# Patient Record
Sex: FEMALE | Race: WHITE | NOT HISPANIC OR LATINO | Employment: UNEMPLOYED | URBAN - NONMETROPOLITAN AREA
[De-identification: names, ages, dates, MRNs, and addresses within clinical notes are randomized per-mention and may not be internally consistent; named-entity substitution may affect disease eponyms.]

---

## 2018-09-24 ENCOUNTER — HOSPITAL ENCOUNTER (EMERGENCY)
Facility: HOSPITAL | Age: 33
Discharge: HOME | End: 2018-09-24
Attending: STUDENT IN AN ORGANIZED HEALTH CARE EDUCATION/TRAINING PROGRAM | Admitting: STUDENT IN AN ORGANIZED HEALTH CARE EDUCATION/TRAINING PROGRAM

## 2018-09-24 VITALS
HEART RATE: 110 BPM | HEIGHT: 65 IN | OXYGEN SATURATION: 97 % | BODY MASS INDEX: 19.43 KG/M2 | WEIGHT: 116.6 LBS | SYSTOLIC BLOOD PRESSURE: 111 MMHG | DIASTOLIC BLOOD PRESSURE: 80 MMHG | TEMPERATURE: 98.2 F | RESPIRATION RATE: 18 BRPM

## 2018-09-24 DIAGNOSIS — N39.0 URINARY TRACT INFECTION WITHOUT HEMATURIA, SITE UNSPECIFIED: Primary | ICD-10-CM

## 2018-09-24 LAB
AMORPH URATE CRY URNS QL MICRO: ABNORMAL /HPF
B-HCG UR QL: NEGATIVE
BACTERIA UR QL AUTO: ABNORMAL /HPF
BILIRUB UR QL STRIP: NEGATIVE
CLARITY UR: ABNORMAL
COLOR UR: YELLOW
GLUCOSE UR STRIP-MCNC: NEGATIVE MG/DL
HGB UR QL STRIP.AUTO: ABNORMAL
HYALINE CASTS UR QL AUTO: ABNORMAL /LPF
KETONES UR QL STRIP: NEGATIVE
LEUKOCYTE ESTERASE UR QL STRIP.AUTO: ABNORMAL
NITRITE UR QL STRIP: NEGATIVE
PH UR STRIP.AUTO: 7 [PH] (ref 5–8)
PROT UR QL STRIP: NEGATIVE
RBC # UR: ABNORMAL /HPF
REF LAB TEST METHOD: ABNORMAL
SP GR UR STRIP: 1.02 (ref 1–1.03)
SQUAMOUS #/AREA URNS HPF: ABNORMAL /HPF
UROBILINOGEN UR QL STRIP: ABNORMAL
WBC UR QL AUTO: ABNORMAL /HPF

## 2018-09-24 PROCEDURE — 81025 URINE PREGNANCY TEST: CPT | Performed by: STUDENT IN AN ORGANIZED HEALTH CARE EDUCATION/TRAINING PROGRAM

## 2018-09-24 PROCEDURE — 81001 URINALYSIS AUTO W/SCOPE: CPT | Performed by: STUDENT IN AN ORGANIZED HEALTH CARE EDUCATION/TRAINING PROGRAM

## 2018-09-24 PROCEDURE — 87086 URINE CULTURE/COLONY COUNT: CPT | Performed by: STUDENT IN AN ORGANIZED HEALTH CARE EDUCATION/TRAINING PROGRAM

## 2018-09-24 PROCEDURE — 99283 EMERGENCY DEPT VISIT LOW MDM: CPT

## 2018-09-24 RX ORDER — CEFUROXIME AXETIL 500 MG/1
500 TABLET ORAL 2 TIMES DAILY
Qty: 14 TABLET | Refills: 0 | Status: SHIPPED | OUTPATIENT
Start: 2018-09-24 | End: 2019-06-19

## 2018-09-24 RX ORDER — PHENAZOPYRIDINE HYDROCHLORIDE 200 MG/1
200 TABLET, FILM COATED ORAL 3 TIMES DAILY PRN
Qty: 9 TABLET | Refills: 0 | OUTPATIENT
Start: 2018-09-24 | End: 2020-08-14

## 2018-09-24 NOTE — ED PROVIDER NOTES
Subjective   Patient is a 33-year-old female that presents with 2 days progressively worsening UTI symptoms.  Patient states it burns when she urinates.  She feels like she can ever fully empty.  Patient reports she had to be admitted for almost right his last November and does not want this to happen again.  She denies fever, chills, sweats, nausea or vomiting.            Review of Systems   All other systems reviewed and are negative.      Past Medical History:   Diagnosis Date   • Hepatitis C        Allergies   Allergen Reactions   • Phenergan [Promethazine Hcl]        History reviewed. No pertinent surgical history.    History reviewed. No pertinent family history.    Social History     Social History   • Marital status:      Social History Main Topics   • Smoking status: Current Every Day Smoker     Packs/day: 0.50     Types: Cigarettes   • Smokeless tobacco: Never Used   • Alcohol use Yes      Comment: on occasion   • Drug use: Yes     Types: Methamphetamines      Comment: former user   • Sexual activity: Defer     Other Topics Concern   • Not on file           Objective   Physical Exam   Nursing note and vitals reviewed.    GEN: No acute distress  Head: Normocephalic, atraumatic  Eyes: Pupils equal round reactive to light  ENT: Posterior pharynx normal in appearance, oral mucosa is moist  Chest: Nontender to palpation  Cardiovascular: Regular rate  Lungs: Clear to auscultation bilaterally  Abdomen: Soft, nontender, nondistended, no peritoneal signs  Extremities: No edema, normal appearance  Neuro: GCS 15  Psych: Mood and affect are appropriate    Procedures           ED Course                  MDM  Number of Diagnoses or Management Options  Urinary tract infection without hematuria, site unspecified:   Diagnosis management comments: Differential diagnosis would include UTI, pyelonephritis, interstitial cystitis, STD, or other concerns  Patient declines STD testing at this time.  Recommended follow-up  in one week with primary care provider if symptoms not improving.  Advised return to the emergency department if new or worsening symptoms.  Use medications as directed.       Amount and/or Complexity of Data Reviewed  Clinical lab tests: reviewed          Final diagnoses:   Urinary tract infection without hematuria, site unspecified            Alex Landaverde MD  09/24/18 0620

## 2018-09-25 LAB — BACTERIA SPEC AEROBE CULT: NORMAL

## 2019-06-19 ENCOUNTER — HOSPITAL ENCOUNTER (EMERGENCY)
Facility: HOSPITAL | Age: 34
Discharge: HOME | End: 2019-06-19
Attending: EMERGENCY MEDICINE | Admitting: EMERGENCY MEDICINE

## 2019-06-19 ENCOUNTER — APPOINTMENT (OUTPATIENT)
Dept: CT IMAGING | Facility: HOSPITAL | Age: 34
End: 2019-06-19

## 2019-06-19 VITALS
WEIGHT: 116 LBS | BODY MASS INDEX: 19.33 KG/M2 | DIASTOLIC BLOOD PRESSURE: 63 MMHG | TEMPERATURE: 99.2 F | HEIGHT: 65 IN | RESPIRATION RATE: 18 BRPM | HEART RATE: 110 BPM | OXYGEN SATURATION: 95 % | SYSTOLIC BLOOD PRESSURE: 103 MMHG

## 2019-06-19 DIAGNOSIS — N12 PYELONEPHRITIS: Primary | ICD-10-CM

## 2019-06-19 LAB
ALBUMIN SERPL-MCNC: 4.2 G/DL (ref 3.5–5)
ALBUMIN/GLOB SERPL: 1.2 G/DL (ref 1–2)
ALP SERPL-CCNC: 101 U/L (ref 38–126)
ALT SERPL W P-5'-P-CCNC: 46 U/L (ref 13–69)
ANION GAP SERPL CALCULATED.3IONS-SCNC: 14.7 MMOL/L (ref 10–20)
AST SERPL-CCNC: 56 U/L (ref 15–46)
B-HCG UR QL: NEGATIVE
BACTERIA UR QL AUTO: ABNORMAL /HPF
BASOPHILS # BLD AUTO: 0.04 10*3/MM3 (ref 0–0.2)
BASOPHILS NFR BLD AUTO: 0.2 % (ref 0–1.5)
BILIRUB SERPL-MCNC: 0.8 MG/DL (ref 0.2–1.3)
BILIRUB UR QL STRIP: NEGATIVE
BUN BLD-MCNC: 12 MG/DL (ref 7–20)
BUN/CREAT SERPL: 17.1 (ref 7.1–23.5)
CALCIUM SPEC-SCNC: 8.8 MG/DL (ref 8.4–10.2)
CHLORIDE SERPL-SCNC: 96 MMOL/L (ref 98–107)
CLARITY UR: ABNORMAL
CO2 SERPL-SCNC: 27 MMOL/L (ref 26–30)
COLOR UR: YELLOW
CREAT BLD-MCNC: 0.7 MG/DL (ref 0.6–1.3)
DEPRECATED RDW RBC AUTO: 45.9 FL (ref 37–54)
EOSINOPHIL # BLD AUTO: 0.01 10*3/MM3 (ref 0–0.4)
EOSINOPHIL NFR BLD AUTO: 0.1 % (ref 0.3–6.2)
ERYTHROCYTE [DISTWIDTH] IN BLOOD BY AUTOMATED COUNT: 13.5 % (ref 12.3–15.4)
GFR SERPL CREATININE-BSD FRML MDRD: 96 ML/MIN/1.73
GLOBULIN UR ELPH-MCNC: 3.6 GM/DL
GLUCOSE BLD-MCNC: 91 MG/DL (ref 74–98)
GLUCOSE UR STRIP-MCNC: NEGATIVE MG/DL
HCT VFR BLD AUTO: 35.5 % (ref 34–46.6)
HGB BLD-MCNC: 11.7 G/DL (ref 12–15.9)
HGB UR QL STRIP.AUTO: ABNORMAL
HYALINE CASTS UR QL AUTO: ABNORMAL /LPF
IMM GRANULOCYTES # BLD AUTO: 0.06 10*3/MM3 (ref 0–0.05)
IMM GRANULOCYTES NFR BLD AUTO: 0.3 % (ref 0–0.5)
KETONES UR QL STRIP: NEGATIVE
LEUKOCYTE ESTERASE UR QL STRIP.AUTO: ABNORMAL
LYMPHOCYTES # BLD AUTO: 2 10*3/MM3 (ref 0.7–3.1)
LYMPHOCYTES NFR BLD AUTO: 11 % (ref 19.6–45.3)
MCH RBC QN AUTO: 30.1 PG (ref 26.6–33)
MCHC RBC AUTO-ENTMCNC: 33 G/DL (ref 31.5–35.7)
MCV RBC AUTO: 91.3 FL (ref 79–97)
MONOCYTES # BLD AUTO: 1.5 10*3/MM3 (ref 0.1–0.9)
MONOCYTES NFR BLD AUTO: 8.3 % (ref 5–12)
MUCOUS THREADS URNS QL MICRO: ABNORMAL /HPF
NEUTROPHILS # BLD AUTO: 14.49 10*3/MM3 (ref 1.7–7)
NEUTROPHILS NFR BLD AUTO: 80.1 % (ref 42.7–76)
NITRITE UR QL STRIP: NEGATIVE
NRBC BLD AUTO-RTO: 0 /100 WBC (ref 0–0.2)
PH UR STRIP.AUTO: 5.5 [PH] (ref 5–8)
PLATELET # BLD AUTO: 366 10*3/MM3 (ref 140–450)
PMV BLD AUTO: 9.5 FL (ref 6–12)
POTASSIUM BLD-SCNC: 3.7 MMOL/L (ref 3.5–5.1)
PROT SERPL-MCNC: 7.8 G/DL (ref 6.3–8.2)
PROT UR QL STRIP: ABNORMAL
RBC # BLD AUTO: 3.89 10*6/MM3 (ref 3.77–5.28)
RBC # UR: ABNORMAL /HPF
REF LAB TEST METHOD: ABNORMAL
SODIUM BLD-SCNC: 134 MMOL/L (ref 137–145)
SP GR UR STRIP: 1.02 (ref 1–1.03)
SQUAMOUS #/AREA URNS HPF: ABNORMAL /HPF
UROBILINOGEN UR QL STRIP: ABNORMAL
WBC NRBC COR # BLD: 18.1 10*3/MM3 (ref 3.4–10.8)
WBC UR QL AUTO: ABNORMAL /HPF

## 2019-06-19 PROCEDURE — 96365 THER/PROPH/DIAG IV INF INIT: CPT

## 2019-06-19 PROCEDURE — 87086 URINE CULTURE/COLONY COUNT: CPT | Performed by: EMERGENCY MEDICINE

## 2019-06-19 PROCEDURE — 74176 CT ABD & PELVIS W/O CONTRAST: CPT

## 2019-06-19 PROCEDURE — 81001 URINALYSIS AUTO W/SCOPE: CPT | Performed by: EMERGENCY MEDICINE

## 2019-06-19 PROCEDURE — 96375 TX/PRO/DX INJ NEW DRUG ADDON: CPT

## 2019-06-19 PROCEDURE — 85025 COMPLETE CBC W/AUTO DIFF WBC: CPT | Performed by: EMERGENCY MEDICINE

## 2019-06-19 PROCEDURE — 25010000002 CEFTRIAXONE SODIUM-DEXTROSE 1-3.74 GM-%(50ML) RECONSTITUTED SOLUTION: Performed by: EMERGENCY MEDICINE

## 2019-06-19 PROCEDURE — 81025 URINE PREGNANCY TEST: CPT | Performed by: EMERGENCY MEDICINE

## 2019-06-19 PROCEDURE — 25010000002 KETOROLAC TROMETHAMINE PER 15 MG: Performed by: EMERGENCY MEDICINE

## 2019-06-19 PROCEDURE — 80053 COMPREHEN METABOLIC PANEL: CPT | Performed by: EMERGENCY MEDICINE

## 2019-06-19 PROCEDURE — 99283 EMERGENCY DEPT VISIT LOW MDM: CPT

## 2019-06-19 RX ORDER — KETOROLAC TROMETHAMINE 30 MG/ML
15 INJECTION, SOLUTION INTRAMUSCULAR; INTRAVENOUS ONCE
Status: COMPLETED | OUTPATIENT
Start: 2019-06-19 | End: 2019-06-19

## 2019-06-19 RX ORDER — CEFTRIAXONE 1 G/50ML
1 INJECTION, SOLUTION INTRAVENOUS ONCE
Status: COMPLETED | OUTPATIENT
Start: 2019-06-19 | End: 2019-06-19

## 2019-06-19 RX ORDER — SODIUM CHLORIDE 0.9 % (FLUSH) 0.9 %
10 SYRINGE (ML) INJECTION AS NEEDED
Status: DISCONTINUED | OUTPATIENT
Start: 2019-06-19 | End: 2019-06-19 | Stop reason: HOSPADM

## 2019-06-19 RX ORDER — CEPHALEXIN 500 MG/1
500 CAPSULE ORAL 2 TIMES DAILY
Qty: 14 CAPSULE | Refills: 0 | Status: SHIPPED | OUTPATIENT
Start: 2019-06-19 | End: 2019-06-26

## 2019-06-19 RX ADMIN — CEFTRIAXONE 1 G: 1 INJECTION, SOLUTION INTRAVENOUS at 02:49

## 2019-06-19 RX ADMIN — SODIUM CHLORIDE 500 ML: 9 INJECTION, SOLUTION INTRAVENOUS at 01:39

## 2019-06-19 RX ADMIN — KETOROLAC TROMETHAMINE 15 MG: 30 INJECTION, SOLUTION INTRAMUSCULAR at 01:41

## 2019-06-20 LAB — BACTERIA SPEC AEROBE CULT: NORMAL

## 2020-08-14 PROBLEM — Z20.822 CLOSE EXPOSURE TO COVID-19 VIRUS: Status: ACTIVE | Noted: 2020-08-14

## 2020-08-14 PROBLEM — R06.02 SHORTNESS OF BREATH: Status: ACTIVE | Noted: 2020-08-14

## 2020-08-14 PROCEDURE — U0002 COVID-19 LAB TEST NON-CDC: HCPCS | Performed by: FAMILY MEDICINE

## 2020-08-14 PROCEDURE — U0004 COV-19 TEST NON-CDC HGH THRU: HCPCS | Performed by: FAMILY MEDICINE

## 2022-12-25 ENCOUNTER — APPOINTMENT (OUTPATIENT)
Dept: CT IMAGING | Facility: HOSPITAL | Age: 37
End: 2022-12-25

## 2022-12-25 ENCOUNTER — HOSPITAL ENCOUNTER (EMERGENCY)
Facility: HOSPITAL | Age: 37
Discharge: HOME | End: 2022-12-26
Attending: EMERGENCY MEDICINE | Admitting: EMERGENCY MEDICINE

## 2022-12-25 VITALS
BODY MASS INDEX: 20.83 KG/M2 | OXYGEN SATURATION: 100 % | HEIGHT: 65 IN | SYSTOLIC BLOOD PRESSURE: 137 MMHG | WEIGHT: 125 LBS | RESPIRATION RATE: 18 BRPM | TEMPERATURE: 98.4 F | DIASTOLIC BLOOD PRESSURE: 95 MMHG | HEART RATE: 111 BPM

## 2022-12-25 DIAGNOSIS — N12 PYELONEPHRITIS: ICD-10-CM

## 2022-12-25 DIAGNOSIS — B27.90 INFECTIOUS MONONUCLEOSIS WITHOUT COMPLICATION, INFECTIOUS MONONUCLEOSIS DUE TO UNSPECIFIED ORGANISM: Primary | ICD-10-CM

## 2022-12-25 LAB
ALBUMIN SERPL-MCNC: 3.8 G/DL (ref 3.5–5.2)
ALBUMIN/GLOB SERPL: 1 G/DL
ALP SERPL-CCNC: 115 U/L (ref 39–117)
ALT SERPL W P-5'-P-CCNC: 19 U/L (ref 1–33)
AMPHET+METHAMPHET UR QL: POSITIVE
AMPHETAMINES UR QL: POSITIVE
ANION GAP SERPL CALCULATED.3IONS-SCNC: 10.4 MMOL/L (ref 5–15)
AST SERPL-CCNC: 23 U/L (ref 1–32)
B-HCG UR QL: NEGATIVE
BACTERIA UR QL AUTO: ABNORMAL /HPF
BARBITURATES UR QL SCN: NEGATIVE
BASOPHILS # BLD AUTO: 0.02 10*3/MM3 (ref 0–0.2)
BASOPHILS NFR BLD AUTO: 0.2 % (ref 0–1.5)
BENZODIAZ UR QL SCN: NEGATIVE
BILIRUB SERPL-MCNC: 0.3 MG/DL (ref 0–1.2)
BILIRUB UR QL STRIP: NEGATIVE
BUN SERPL-MCNC: 15 MG/DL (ref 6–20)
BUN/CREAT SERPL: 17.9 (ref 7–25)
BUPRENORPHINE SERPL-MCNC: NEGATIVE NG/ML
CALCIUM SPEC-SCNC: 9.2 MG/DL (ref 8.6–10.5)
CANNABINOIDS SERPL QL: NEGATIVE
CHLORIDE SERPL-SCNC: 101 MMOL/L (ref 98–107)
CLARITY UR: ABNORMAL
CO2 SERPL-SCNC: 23.6 MMOL/L (ref 22–29)
COCAINE UR QL: NEGATIVE
COLOR UR: YELLOW
CREAT SERPL-MCNC: 0.84 MG/DL (ref 0.57–1)
D DIMER PPP FEU-MCNC: 0.71 MCGFEU/ML (ref 0–0.5)
DEPRECATED RDW RBC AUTO: 42.5 FL (ref 37–54)
EGFRCR SERPLBLD CKD-EPI 2021: 91.3 ML/MIN/1.73
EOSINOPHIL # BLD AUTO: 0.01 10*3/MM3 (ref 0–0.4)
EOSINOPHIL NFR BLD AUTO: 0.1 % (ref 0.3–6.2)
ERYTHROCYTE [DISTWIDTH] IN BLOOD BY AUTOMATED COUNT: 12.4 % (ref 12.3–15.4)
GLOBULIN UR ELPH-MCNC: 3.7 GM/DL
GLUCOSE SERPL-MCNC: 110 MG/DL (ref 65–99)
GLUCOSE UR STRIP-MCNC: NEGATIVE MG/DL
HCT VFR BLD AUTO: 31.3 % (ref 34–46.6)
HETEROPH AB SER QL LA: POSITIVE
HGB BLD-MCNC: 10.5 G/DL (ref 12–15.9)
HGB UR QL STRIP.AUTO: NEGATIVE
HOLD SPECIMEN: NORMAL
HOLD SPECIMEN: NORMAL
HYALINE CASTS UR QL AUTO: ABNORMAL /LPF
IMM GRANULOCYTES # BLD AUTO: 0.05 10*3/MM3 (ref 0–0.05)
IMM GRANULOCYTES NFR BLD AUTO: 0.4 % (ref 0–0.5)
KETONES UR QL STRIP: NEGATIVE
LEUKOCYTE ESTERASE UR QL STRIP.AUTO: ABNORMAL
LYMPHOCYTES # BLD AUTO: 1.44 10*3/MM3 (ref 0.7–3.1)
LYMPHOCYTES NFR BLD AUTO: 11.1 % (ref 19.6–45.3)
MCH RBC QN AUTO: 31.4 PG (ref 26.6–33)
MCHC RBC AUTO-ENTMCNC: 33.5 G/DL (ref 31.5–35.7)
MCV RBC AUTO: 93.7 FL (ref 79–97)
METHADONE UR QL SCN: NEGATIVE
MONOCYTES # BLD AUTO: 1.11 10*3/MM3 (ref 0.1–0.9)
MONOCYTES NFR BLD AUTO: 8.5 % (ref 5–12)
NEUTROPHILS NFR BLD AUTO: 10.36 10*3/MM3 (ref 1.7–7)
NEUTROPHILS NFR BLD AUTO: 79.7 % (ref 42.7–76)
NITRITE UR QL STRIP: POSITIVE
NRBC BLD AUTO-RTO: 0 /100 WBC (ref 0–0.2)
OPIATES UR QL: NEGATIVE
OXYCODONE UR QL SCN: NEGATIVE
PCP UR QL SCN: NEGATIVE
PH UR STRIP.AUTO: 6 [PH] (ref 5–8)
PLATELET # BLD AUTO: 463 10*3/MM3 (ref 140–450)
PMV BLD AUTO: 8.7 FL (ref 6–12)
POTASSIUM SERPL-SCNC: 3.7 MMOL/L (ref 3.5–5.2)
PROCALCITONIN SERPL-MCNC: 0.12 NG/ML (ref 0–0.25)
PROPOXYPH UR QL: NEGATIVE
PROT SERPL-MCNC: 7.5 G/DL (ref 6–8.5)
PROT UR QL STRIP: ABNORMAL
RBC # BLD AUTO: 3.34 10*6/MM3 (ref 3.77–5.28)
RBC # UR STRIP: ABNORMAL /HPF
REF LAB TEST METHOD: ABNORMAL
SODIUM SERPL-SCNC: 135 MMOL/L (ref 136–145)
SP GR UR STRIP: 1.02 (ref 1–1.03)
SQUAMOUS #/AREA URNS HPF: ABNORMAL /HPF
TRICYCLICS UR QL SCN: NEGATIVE
TROPONIN T SERPL-MCNC: <0.01 NG/ML (ref 0–0.03)
UROBILINOGEN UR QL STRIP: ABNORMAL
WBC # UR STRIP: ABNORMAL /HPF
WBC NRBC COR # BLD: 12.99 10*3/MM3 (ref 3.4–10.8)
WHOLE BLOOD HOLD COAG: NORMAL
WHOLE BLOOD HOLD SPECIMEN: NORMAL

## 2022-12-25 PROCEDURE — 81025 URINE PREGNANCY TEST: CPT | Performed by: PHYSICIAN ASSISTANT

## 2022-12-25 PROCEDURE — 84484 ASSAY OF TROPONIN QUANT: CPT | Performed by: PHYSICIAN ASSISTANT

## 2022-12-25 PROCEDURE — 85025 COMPLETE CBC W/AUTO DIFF WBC: CPT | Performed by: PHYSICIAN ASSISTANT

## 2022-12-25 PROCEDURE — 87086 URINE CULTURE/COLONY COUNT: CPT | Performed by: EMERGENCY MEDICINE

## 2022-12-25 PROCEDURE — 85379 FIBRIN DEGRADATION QUANT: CPT | Performed by: PHYSICIAN ASSISTANT

## 2022-12-25 PROCEDURE — 87186 SC STD MICRODIL/AGAR DIL: CPT | Performed by: EMERGENCY MEDICINE

## 2022-12-25 PROCEDURE — 25010000002 IOPAMIDOL 61 % SOLUTION: Performed by: EMERGENCY MEDICINE

## 2022-12-25 PROCEDURE — 80053 COMPREHEN METABOLIC PANEL: CPT | Performed by: PHYSICIAN ASSISTANT

## 2022-12-25 PROCEDURE — 71275 CT ANGIOGRAPHY CHEST: CPT

## 2022-12-25 PROCEDURE — 81001 URINALYSIS AUTO W/SCOPE: CPT

## 2022-12-25 PROCEDURE — 87077 CULTURE AEROBIC IDENTIFY: CPT | Performed by: EMERGENCY MEDICINE

## 2022-12-25 PROCEDURE — 80306 DRUG TEST PRSMV INSTRMNT: CPT | Performed by: PHYSICIAN ASSISTANT

## 2022-12-25 PROCEDURE — 93005 ELECTROCARDIOGRAM TRACING: CPT | Performed by: EMERGENCY MEDICINE

## 2022-12-25 PROCEDURE — 86308 HETEROPHILE ANTIBODY SCREEN: CPT | Performed by: PHYSICIAN ASSISTANT

## 2022-12-25 PROCEDURE — 84145 PROCALCITONIN (PCT): CPT | Performed by: PHYSICIAN ASSISTANT

## 2022-12-25 PROCEDURE — 99284 EMERGENCY DEPT VISIT MOD MDM: CPT

## 2022-12-25 RX ORDER — FLUCONAZOLE 150 MG/1
150 TABLET ORAL ONCE
Qty: 1 TABLET | Refills: 0 | Status: SHIPPED | OUTPATIENT
Start: 2022-12-26 | End: 2022-12-26

## 2022-12-25 RX ORDER — CEFDINIR 300 MG/1
300 CAPSULE ORAL ONCE
Status: COMPLETED | OUTPATIENT
Start: 2022-12-25 | End: 2022-12-26

## 2022-12-25 RX ORDER — SODIUM CHLORIDE 0.9 % (FLUSH) 0.9 %
10 SYRINGE (ML) INJECTION AS NEEDED
Status: DISCONTINUED | OUTPATIENT
Start: 2022-12-25 | End: 2022-12-26 | Stop reason: HOSPADM

## 2022-12-25 RX ORDER — CEFDINIR 300 MG/1
300 CAPSULE ORAL 2 TIMES DAILY
Qty: 14 CAPSULE | Refills: 0 | Status: SHIPPED | OUTPATIENT
Start: 2022-12-25

## 2022-12-25 RX ADMIN — IOPAMIDOL 75 ML: 612 INJECTION, SOLUTION INTRAVENOUS at 22:47

## 2022-12-26 RX ADMIN — CEFDINIR 300 MG: 300 CAPSULE ORAL at 00:04

## 2022-12-26 NOTE — ED NOTES
Pt states that this all started when she had anal sex then vaginal sex that ended in a UTI. She states she didn't have enough antibiotics to treat it all the way and now she hurts all over.

## 2022-12-26 NOTE — ED PROVIDER NOTES
"Subjective  History of Present Illness:    Chief Complaint:   Chief Complaint   Patient presents with   • Fatigue   • Pain      History of Present Illness: Tonya Richardson is a 37 y.o. female who presents to the emergency department complaining of concern for \"bacterial endocarditis.\"  Patient states since before Thanksgiving, over a month ago, she has had joint pain, chest pain with deep breath, body aches, fatigue, fevers and a sensation that her spleen is swollen.  She does have a history of IV drug use but states she has been clean for 19 months.  No recent URI symptoms although she states she does feel like she has flulike symptoms.  Onset: Over a month ago  Duration: Ongoing  Exacerbating / Alleviating factors: None  Associated symptoms: None      Nurses Notes reviewed and agree, including vitals, allergies, social history and prior medical history.     Review of Systems   Constitutional: Positive for fatigue and fever.   HENT: Negative.    Eyes: Negative.    Respiratory: Negative.    Cardiovascular: Positive for chest pain.   Gastrointestinal: Negative.    Genitourinary: Negative.    Musculoskeletal: Positive for joint swelling.   Skin: Negative.    Neurological: Negative.    Psychiatric/Behavioral: Negative.        Past Medical History:   Diagnosis Date   • Hepatitis C        Allergies:    Phenergan [promethazine hcl]      History reviewed. No pertinent surgical history.      Social History     Socioeconomic History   • Marital status:    Tobacco Use   • Smoking status: Every Day     Packs/day: 0.50     Types: Cigarettes   • Smokeless tobacco: Never   Substance and Sexual Activity   • Alcohol use: Yes     Comment: on occasion   • Drug use: Yes     Types: Methamphetamines     Comment: former user   • Sexual activity: Defer         History reviewed. No pertinent family history.    Objective  Physical Exam:  /95 (BP Location: Left arm, Patient Position: Sitting)   Pulse 111   Temp 98.4 °F (36.9 °C) " "(Oral)   Resp 18   Ht 165.1 cm (65\")   Wt 56.7 kg (125 lb)   SpO2 100%   BMI 20.80 kg/m²      Physical Exam  Vitals and nursing note reviewed.   Constitutional:       General: She is not in acute distress.     Appearance: Normal appearance. She is normal weight. She is not ill-appearing, toxic-appearing or diaphoretic.   HENT:      Head: Normocephalic and atraumatic.      Nose: Nose normal.   Eyes:      Extraocular Movements: Extraocular movements intact.   Cardiovascular:      Rate and Rhythm: Regular rhythm. Tachycardia present.      Heart sounds: Normal heart sounds. No murmur heard.  Pulmonary:      Effort: Pulmonary effort is normal. No respiratory distress.      Breath sounds: Normal breath sounds. No stridor. No wheezing, rhonchi or rales.   Abdominal:      General: Abdomen is flat.      Palpations: Abdomen is soft.      Tenderness: There is no abdominal tenderness. There is no guarding or rebound.   Musculoskeletal:         General: Normal range of motion.      Cervical back: Normal range of motion and neck supple. No rigidity.   Skin:     General: Skin is warm and dry.   Neurological:      General: No focal deficit present.      Mental Status: She is alert and oriented to person, place, and time. Mental status is at baseline.   Psychiatric:         Attention and Perception: Attention normal.         Mood and Affect: Mood is anxious.         Speech: Speech is rapid and pressured.           Procedures    ED Course:    ED Course as of 12/25/22 2354   Sun Dec 25, 2022   2057 EKG interpreted by me reveals sinus tachycardia rate 114.  No ectopy no ischemic changes [PF]   2117 WBC(!): 12.99 [TM]   2117 Hemoglobin(!): 10.5 [TM]   2117 Hematocrit(!): 31.3 [TM]   2141 Methamphetamine, Ur(!): Positive [TM]   2141 Amphetamine, Urine Qual(!): Positive [TM]   2141 D-Dimer, Quant(!): 0.71 [TM]   2141 WBC, UA(!): 31-50 [TM]   2141 Nitrite, UA(!): Positive [TM]   2141 Leukocytes, UA(!): Moderate (2+) [TM]   2153 " Procalcitonin: 0.12 [TM]   2202 HCG, Urine QL: Negative [TM]   2202 WBC, UA(!): 31-50 [TM]   2202 Monospot(!): Positive [TM]      ED Course User Index  [PF] Rico Zamudio,   [TM] Ang Mcallister PA-C       Lab Results (last 24 hours)     Procedure Component Value Units Date/Time    CBC & Differential [[id number]]  (Abnormal) Collected: 12/25/22 2054    Specimen: Blood Updated: 12/25/22 2116    Narrative:      The following orders were created for panel order CBC & Differential.  Procedure                               Abnormality         Status                     ---------                               -----------         ------                     CBC Auto Differential[[id number]]        Abnormal            Final result                 Please view results for these tests on the individual orders.    Comprehensive Metabolic Panel [[id number]]  (Abnormal) Collected: 12/25/22 2054    Specimen: Blood Updated: 12/25/22 2151     Glucose 110 mg/dL      BUN 15 mg/dL      Creatinine 0.84 mg/dL      Sodium 135 mmol/L      Potassium 3.7 mmol/L      Comment: Slight hemolysis detected by analyzer. Results may be affected.        Chloride 101 mmol/L      CO2 23.6 mmol/L      Calcium 9.2 mg/dL      Total Protein 7.5 g/dL      Albumin 3.80 g/dL      ALT (SGPT) 19 U/L      AST (SGOT) 23 U/L      Alkaline Phosphatase 115 U/L      Total Bilirubin 0.3 mg/dL      Globulin 3.7 gm/dL      A/G Ratio 1.0 g/dL      BUN/Creatinine Ratio 17.9     Anion Gap 10.4 mmol/L      eGFR 91.3 mL/min/1.73      Comment: National Kidney Foundation and American Society of Nephrology (ASN) Task Force recommended calculation based on the Chronic Kidney Disease Epidemiology Collaboration (CKD-EPI) equation refit without adjustment for race.       Narrative:      GFR Normal >60  Chronic Kidney Disease <60  Kidney Failure <15      Troponin [[id number]]  (Normal) Collected: 12/25/22 2054    Specimen: Blood Updated: 12/25/22 2151     Troponin T <0.010  "ng/mL     Narrative:      Troponin T Reference Range:  <= 0.03 ng/mL-   Negative for AMI  >0.03 ng/mL-     Abnormal for myocardial necrosis.  Clinicians would have to utilize clinical acumen, EKG, Troponin and serial changes to determine if it is an Acute Myocardial Infarction or myocardial injury due to an underlying chronic condition.       Results may be falsely decreased if patient taking Biotin.      D-dimer, Quantitative [[id number]]  (Abnormal) Collected: 12/25/22 2054    Specimen: Blood Updated: 12/25/22 2136     D-Dimer, Quantitative 0.71 MCGFEU/mL     Narrative:      According to the assay 's published package insert, a normal (<0.50 MCGFEU/mL) D-dimer result in conjunction with a non-high clinical probability assessment, excludes deep vein thrombosis (DVT) and pulmonary embolism (PE) with high sensitivity.    D-dimer values increase with age and this can make VTE exclusion of an older population difficult. To address this, the American College of [employer], based on best available evidence and recent guidelines, recommends that clinicians use age-adjusted D-dimer thresholds in patients greater than 50 years of age with: a) a low probability of PE who do not meet all Pulmonary Embolism Rule Out Criteria, or b) in those with intermediate probability of PE.   The formula for an age-adjusted D-dimer cut-off is \"age/100\".  For example, a 60 year old patient would have an age-adjusted cut-off of 0.60 MCGFEU/mL and an 80 year old 0.80 MCGFEU/mL.    Procalcitonin [[id number]]  (Normal) Collected: 12/25/22 2054    Specimen: Blood Updated: 12/25/22 2140     Procalcitonin 0.12 ng/mL     Narrative:      As a Marker for Sepsis (Non-Neonates):    1. <0.5 ng/mL represents a low risk of severe sepsis and/or septic shock.  2. >2 ng/mL represents a high risk of severe sepsis and/or septic shock.    As a Marker for Lower Respiratory Tract Infections that require antibiotic therapy:    PCT on Admission    " "Antibiotic Therapy       6-12 Hrs later    >0.5                Strongly Recommended  >0.25 - <0.5        Recommended   0.1 - 0.25          Discouraged              Remeasure/reassess PCT  <0.1                Strongly Discouraged     Remeasure/reassess PCT    As 28 day mortality risk marker: \"Change in Procalcitonin Result\" (>80% or <=80%) if Day 0 (or Day 1) and Day 4 values are available. Refer to http://www.[facility]-pct-calculator.com    Change in PCT <=80%  A decrease of PCT levels below or equal to 80% defines a positive change in PCT test result representing a higher risk for 28-day all-cause mortality of patients diagnosed with severe sepsis for septic shock.    Change in PCT >80%  A decrease of PCT levels of more than 80% defines a negative change in PCT result representing a lower risk for 28-day all-cause mortality of patients diagnosed with severe sepsis or septic shock.       CBC Auto Differential [[id number]]  (Abnormal) Collected: 12/25/22 2054    Specimen: Blood Updated: 12/25/22 2116     WBC 12.99 10*3/mm3      RBC 3.34 10*6/mm3      Hemoglobin 10.5 g/dL      Hematocrit 31.3 %      MCV 93.7 fL      MCH 31.4 pg      MCHC 33.5 g/dL      RDW 12.4 %      RDW-SD 42.5 fl      MPV 8.7 fL      Platelets 463 10*3/mm3      Neutrophil % 79.7 %      Lymphocyte % 11.1 %      Monocyte % 8.5 %      Eosinophil % 0.1 %      Basophil % 0.2 %      Immature Grans % 0.4 %      Neutrophils, Absolute 10.36 10*3/mm3      Lymphocytes, Absolute 1.44 10*3/mm3      Monocytes, Absolute 1.11 10*3/mm3      Eosinophils, Absolute 0.01 10*3/mm3      Basophils, Absolute 0.02 10*3/mm3      Immature Grans, Absolute 0.05 10*3/mm3      nRBC 0.0 /100 WBC     Mononucleosis Screen [[id number]]  (Abnormal) Collected: 12/25/22 2054    Specimen: Blood Updated: 12/25/22 2158     Monospot Positive    Urinalysis With Microscopic If Indicated (No Culture) - Urine, Clean Catch [[id number]]  (Abnormal) Collected: 12/25/22 2109    Specimen: Urine, Clean " Catch Updated: 12/25/22 2119     Color, UA Yellow     Appearance, UA Cloudy     pH, UA 6.0     Specific Gravity, UA 1.024     Glucose, UA Negative     Ketones, UA Negative     Bilirubin, UA Negative     Blood, UA Negative     Protein, UA 30 mg/dL (1+)     Leuk Esterase, UA Moderate (2+)     Nitrite, UA Positive     Urobilinogen, UA 1.0 E.U./dL    Pregnancy, Urine - Urine, Clean Catch [[id number]]  (Normal) Collected: 12/25/22 2109    Specimen: Urine, Clean Catch Updated: 12/25/22 2157     HCG, Urine QL Negative    Urine Drug Screen - Urine, Clean Catch [[id number]]  (Abnormal) Collected: 12/25/22 2109    Specimen: Urine, Clean Catch Updated: 12/25/22 2129     THC, Screen, Urine Negative     Phencyclidine (PCP), Urine Negative     Cocaine Screen, Urine Negative     Methamphetamine, Ur Positive     Opiate Screen Negative     Amphetamine Screen, Urine Positive     Benzodiazepine Screen, Urine Negative     Tricyclic Antidepressants Screen Negative     Methadone Screen, Urine Negative     Barbiturates Screen, Urine Negative     Oxycodone Screen, Urine Negative     Propoxyphene Screen Negative     Buprenorphine, Screen, Urine Negative    Narrative:      Limitations of this procedure include the possibility of false positives due to interfering substances in the urine sample. Clinical data should be correlated with any questionable result. Positive results should be considered Presumptive Positive until results are confirmed with another methodology such as HPLC or GCMS.    Urinalysis, Microscopic Only - Urine, Clean Catch [[id number]]  (Abnormal) Collected: 12/25/22 2109    Specimen: Urine, Clean Catch Updated: 12/25/22 2126     RBC, UA None Seen /HPF      WBC, UA 31-50 /HPF      Bacteria, UA None Seen /HPF      Squamous Epithelial Cells, UA 3-6 /HPF      Hyaline Casts, UA None Seen /LPF      Methodology Manual Light Microscopy    Urine Culture - Urine, Urine, Clean Catch [[id number]] Collected: 12/25/22 2109    Specimen:  Urine, Clean Catch Updated: 12/25/22 2348           No radiology results from the last 24 hrs       [facility]    Tonya Richardson is a 37 y.o. female who presents to the emergency department for evaluation of body aches, fever, fatigue    Differential diagnosis includes viral illness, mononucleosis among other etiologies.    CBC, CMP, procalcitonin, troponin, urine, urine drug screen, Monospot, D-dimer ordered for further evaluation of the patient's presentation.    Chart review including any outside testing was elevated D-dimer at 0.71, normal troponin, normal Pro-Emery, mildly elevated WBC count.  Positive for meth and amphetamines on urine drug screen    Patient positive for mononucleosis, CT scan ordered of the chest PE protocol given elevated D-dimer.  Results pending.  Transition of care to Dr. Zamudio at this time after discussion of case labs and management.    Plan for disposition is pending.  Patient/family comfortable with and understanding of the plan.      23:54 EST  I received care from physicians assistant regarding follow-up of CT angiogram of the chest revealing no evidence of PE.  Focus of diminished renal cortical enhancement in the upper pole of the left kidney concerning for pyelonephritis.  Patient does have 31-50 white cells with no bacteria, moderate leukocytes and positive nitrite.  Patient had been on amoxicillin which she was taking for pyelonephritis could be partially treated.  Will treat with Omnicef, add urine culture.  Conservative measures in regards to the positive mono.  Final diagnoses:   Infectious mononucleosis without complication, infectious mononucleosis due to unspecified organism   Pyelonephritis        Rico Zamudio, DO  12/25/22 4506

## 2022-12-28 LAB — BACTERIA SPEC AEROBE CULT: ABNORMAL

## 2023-07-23 ENCOUNTER — APPOINTMENT (OUTPATIENT)
Dept: CT IMAGING | Facility: HOSPITAL | Age: 38
End: 2023-07-23
Payer: COMMERCIAL

## 2023-07-23 ENCOUNTER — HOSPITAL ENCOUNTER (OUTPATIENT)
Facility: HOSPITAL | Age: 38
Setting detail: OBSERVATION
Discharge: HOME | End: 2023-07-26
Attending: EMERGENCY MEDICINE
Payer: COMMERCIAL

## 2023-07-23 ENCOUNTER — APPOINTMENT (OUTPATIENT)
Dept: GENERAL RADIOLOGY | Facility: HOSPITAL | Age: 38
End: 2023-07-23
Payer: COMMERCIAL

## 2023-07-23 DIAGNOSIS — N12 PYELONEPHRITIS: Primary | ICD-10-CM

## 2023-07-23 LAB
ALBUMIN SERPL-MCNC: 3 G/DL (ref 3.5–5.2)
ALBUMIN/GLOB SERPL: 0.9 G/DL
ALP SERPL-CCNC: 237 U/L (ref 39–117)
ALT SERPL W P-5'-P-CCNC: 21 U/L (ref 1–33)
AMPHET+METHAMPHET UR QL: POSITIVE
AMPHETAMINES UR QL: POSITIVE
ANION GAP SERPL CALCULATED.3IONS-SCNC: 11.8 MMOL/L (ref 5–15)
AST SERPL-CCNC: 15 U/L (ref 1–32)
BACTERIA UR QL AUTO: ABNORMAL /HPF
BARBITURATES UR QL SCN: NEGATIVE
BASOPHILS # BLD AUTO: 0.03 10*3/MM3 (ref 0–0.2)
BASOPHILS NFR BLD AUTO: 0.2 % (ref 0–1.5)
BENZODIAZ UR QL SCN: NEGATIVE
BILIRUB SERPL-MCNC: 0.3 MG/DL (ref 0–1.2)
BILIRUB UR QL STRIP: NEGATIVE
BUN SERPL-MCNC: 9 MG/DL (ref 6–20)
BUN/CREAT SERPL: 14.1 (ref 7–25)
BUPRENORPHINE SERPL-MCNC: NEGATIVE NG/ML
CALCIUM SPEC-SCNC: 8.4 MG/DL (ref 8.6–10.5)
CANNABINOIDS SERPL QL: NEGATIVE
CHLORIDE SERPL-SCNC: 100 MMOL/L (ref 98–107)
CLARITY UR: ABNORMAL
CO2 SERPL-SCNC: 24.2 MMOL/L (ref 22–29)
COCAINE UR QL: POSITIVE
COLOR UR: YELLOW
CREAT SERPL-MCNC: 0.64 MG/DL (ref 0.57–1)
D-LACTATE SERPL-SCNC: 0.7 MMOL/L (ref 0.5–2)
DEPRECATED RDW RBC AUTO: 41 FL (ref 37–54)
EGFRCR SERPLBLD CKD-EPI 2021: 115.5 ML/MIN/1.73
EOSINOPHIL # BLD AUTO: 0.02 10*3/MM3 (ref 0–0.4)
EOSINOPHIL NFR BLD AUTO: 0.1 % (ref 0.3–6.2)
ERYTHROCYTE [DISTWIDTH] IN BLOOD BY AUTOMATED COUNT: 12.3 % (ref 12.3–15.4)
FLUAV RNA RESP QL NAA+PROBE: NOT DETECTED
FLUBV RNA RESP QL NAA+PROBE: NOT DETECTED
GLOBULIN UR ELPH-MCNC: 3.3 GM/DL
GLUCOSE SERPL-MCNC: 96 MG/DL (ref 65–99)
GLUCOSE UR STRIP-MCNC: NEGATIVE MG/DL
HCT VFR BLD AUTO: 31.2 % (ref 34–46.6)
HGB BLD-MCNC: 10.8 G/DL (ref 12–15.9)
HGB UR QL STRIP.AUTO: ABNORMAL
HOLD SPECIMEN: NORMAL
HOLD SPECIMEN: NORMAL
HYALINE CASTS UR QL AUTO: ABNORMAL /LPF
IMM GRANULOCYTES # BLD AUTO: 0.12 10*3/MM3 (ref 0–0.05)
IMM GRANULOCYTES NFR BLD AUTO: 0.7 % (ref 0–0.5)
KETONES UR QL STRIP: NEGATIVE
LEUKOCYTE ESTERASE UR QL STRIP.AUTO: ABNORMAL
LIPASE SERPL-CCNC: 12 U/L (ref 13–60)
LYMPHOCYTES # BLD AUTO: 2.1 10*3/MM3 (ref 0.7–3.1)
LYMPHOCYTES NFR BLD AUTO: 12.3 % (ref 19.6–45.3)
MCH RBC QN AUTO: 31.6 PG (ref 26.6–33)
MCHC RBC AUTO-ENTMCNC: 34.6 G/DL (ref 31.5–35.7)
MCV RBC AUTO: 91.2 FL (ref 79–97)
METHADONE UR QL SCN: NEGATIVE
MONOCYTES # BLD AUTO: 1.51 10*3/MM3 (ref 0.1–0.9)
MONOCYTES NFR BLD AUTO: 8.8 % (ref 5–12)
NEUTROPHILS NFR BLD AUTO: 13.31 10*3/MM3 (ref 1.7–7)
NEUTROPHILS NFR BLD AUTO: 77.9 % (ref 42.7–76)
NITRITE UR QL STRIP: NEGATIVE
NRBC BLD AUTO-RTO: 0 /100 WBC (ref 0–0.2)
OPIATES UR QL: NEGATIVE
OXYCODONE UR QL SCN: NEGATIVE
PCP UR QL SCN: NEGATIVE
PH UR STRIP.AUTO: 7.5 [PH] (ref 5–8)
PLATELET # BLD AUTO: 331 10*3/MM3 (ref 140–450)
PMV BLD AUTO: 9 FL (ref 6–12)
POTASSIUM SERPL-SCNC: 3.5 MMOL/L (ref 3.5–5.2)
PROCALCITONIN SERPL-MCNC: 4.21 NG/ML (ref 0–0.25)
PROPOXYPH UR QL: NEGATIVE
PROT SERPL-MCNC: 6.3 G/DL (ref 6–8.5)
PROT UR QL STRIP: ABNORMAL
RBC # BLD AUTO: 3.42 10*6/MM3 (ref 3.77–5.28)
RBC # UR STRIP: ABNORMAL /HPF
REF LAB TEST METHOD: ABNORMAL
S PYO AG THROAT QL: NEGATIVE
SARS-COV-2 RNA RESP QL NAA+PROBE: NOT DETECTED
SODIUM SERPL-SCNC: 136 MMOL/L (ref 136–145)
SP GR UR STRIP: 1.01 (ref 1–1.03)
SQUAMOUS #/AREA URNS HPF: ABNORMAL /HPF
TRICYCLICS UR QL SCN: NEGATIVE
UROBILINOGEN UR QL STRIP: ABNORMAL
WBC # UR STRIP: ABNORMAL /HPF
WBC NRBC COR # BLD: 17.09 10*3/MM3 (ref 3.4–10.8)
WHOLE BLOOD HOLD SPECIMEN: NORMAL

## 2023-07-23 PROCEDURE — 99285 EMERGENCY DEPT VISIT HI MDM: CPT

## 2023-07-23 PROCEDURE — 85025 COMPLETE CBC W/AUTO DIFF WBC: CPT

## 2023-07-23 PROCEDURE — 36415 COLL VENOUS BLD VENIPUNCTURE: CPT

## 2023-07-23 PROCEDURE — 87636 SARSCOV2 & INF A&B AMP PRB: CPT

## 2023-07-23 PROCEDURE — 87186 SC STD MICRODIL/AGAR DIL: CPT | Performed by: NURSE PRACTITIONER

## 2023-07-23 PROCEDURE — G0378 HOSPITAL OBSERVATION PER HR: HCPCS

## 2023-07-23 PROCEDURE — 87086 URINE CULTURE/COLONY COUNT: CPT | Performed by: NURSE PRACTITIONER

## 2023-07-23 PROCEDURE — 96365 THER/PROPH/DIAG IV INF INIT: CPT

## 2023-07-23 PROCEDURE — 84145 PROCALCITONIN (PCT): CPT | Performed by: EMERGENCY MEDICINE

## 2023-07-23 PROCEDURE — 87880 STREP A ASSAY W/OPTIC: CPT | Performed by: NURSE PRACTITIONER

## 2023-07-23 PROCEDURE — 80053 COMPREHEN METABOLIC PANEL: CPT

## 2023-07-23 PROCEDURE — 81025 URINE PREGNANCY TEST: CPT | Performed by: FAMILY MEDICINE

## 2023-07-23 PROCEDURE — 87077 CULTURE AEROBIC IDENTIFY: CPT | Performed by: NURSE PRACTITIONER

## 2023-07-23 PROCEDURE — 83690 ASSAY OF LIPASE: CPT

## 2023-07-23 PROCEDURE — 71275 CT ANGIOGRAPHY CHEST: CPT

## 2023-07-23 PROCEDURE — 74176 CT ABD & PELVIS W/O CONTRAST: CPT

## 2023-07-23 PROCEDURE — 87040 BLOOD CULTURE FOR BACTERIA: CPT | Performed by: NURSE PRACTITIONER

## 2023-07-23 PROCEDURE — 83605 ASSAY OF LACTIC ACID: CPT | Performed by: NURSE PRACTITIONER

## 2023-07-23 PROCEDURE — 81001 URINALYSIS AUTO W/SCOPE: CPT

## 2023-07-23 PROCEDURE — 25510000001 IOPAMIDOL 61 % SOLUTION: Performed by: EMERGENCY MEDICINE

## 2023-07-23 PROCEDURE — 80306 DRUG TEST PRSMV INSTRMNT: CPT | Performed by: NURSE PRACTITIONER

## 2023-07-23 PROCEDURE — 25010000002 CEFTRIAXONE SODIUM-DEXTROSE 1000-3.74 MG-%(50ML) RECONSTITUTED SOLUTION: Performed by: NURSE PRACTITIONER

## 2023-07-23 PROCEDURE — 71045 X-RAY EXAM CHEST 1 VIEW: CPT

## 2023-07-23 PROCEDURE — 87081 CULTURE SCREEN ONLY: CPT | Performed by: NURSE PRACTITIONER

## 2023-07-23 RX ORDER — CEFTRIAXONE 1 G/50ML
1000 INJECTION, SOLUTION INTRAVENOUS ONCE
Status: COMPLETED | OUTPATIENT
Start: 2023-07-23 | End: 2023-07-23

## 2023-07-23 RX ORDER — SODIUM CHLORIDE 0.9 % (FLUSH) 0.9 %
10 SYRINGE (ML) INJECTION AS NEEDED
Status: DISCONTINUED | OUTPATIENT
Start: 2023-07-23 | End: 2023-07-26 | Stop reason: HOSPADM

## 2023-07-23 RX ADMIN — SODIUM CHLORIDE 2000 ML: 9 INJECTION, SOLUTION INTRAVENOUS at 23:30

## 2023-07-23 RX ADMIN — IOPAMIDOL 80 ML: 612 INJECTION, SOLUTION INTRAVENOUS at 21:04

## 2023-07-23 RX ADMIN — CEFTRIAXONE 1000 MG: 1 INJECTION, SOLUTION INTRAVENOUS at 19:48

## 2023-07-23 RX ADMIN — SODIUM CHLORIDE 1000 ML: 9 INJECTION, SOLUTION INTRAVENOUS at 19:48

## 2023-07-24 LAB
ANION GAP SERPL CALCULATED.3IONS-SCNC: 10.7 MMOL/L (ref 5–15)
B-HCG UR QL: NEGATIVE
BUN SERPL-MCNC: 7 MG/DL (ref 6–20)
BUN/CREAT SERPL: 11.7 (ref 7–25)
CALCIUM SPEC-SCNC: 7.9 MG/DL (ref 8.6–10.5)
CHLORIDE SERPL-SCNC: 105 MMOL/L (ref 98–107)
CO2 SERPL-SCNC: 21.3 MMOL/L (ref 22–29)
CREAT SERPL-MCNC: 0.6 MG/DL (ref 0.57–1)
DEPRECATED RDW RBC AUTO: 41.6 FL (ref 37–54)
EGFRCR SERPLBLD CKD-EPI 2021: 117.3 ML/MIN/1.73
ERYTHROCYTE [DISTWIDTH] IN BLOOD BY AUTOMATED COUNT: 12.4 % (ref 12.3–15.4)
GLUCOSE SERPL-MCNC: 91 MG/DL (ref 65–99)
HCT VFR BLD AUTO: 28.5 % (ref 34–46.6)
HGB BLD-MCNC: 9.7 G/DL (ref 12–15.9)
MCH RBC QN AUTO: 31.2 PG (ref 26.6–33)
MCHC RBC AUTO-ENTMCNC: 34 G/DL (ref 31.5–35.7)
MCV RBC AUTO: 91.6 FL (ref 79–97)
PLATELET # BLD AUTO: 340 10*3/MM3 (ref 140–450)
PMV BLD AUTO: 9.2 FL (ref 6–12)
POTASSIUM SERPL-SCNC: 3.4 MMOL/L (ref 3.5–5.2)
POTASSIUM SERPL-SCNC: 4.1 MMOL/L (ref 3.5–5.2)
RBC # BLD AUTO: 3.11 10*6/MM3 (ref 3.77–5.28)
SODIUM SERPL-SCNC: 137 MMOL/L (ref 136–145)
WBC NRBC COR # BLD: 12.8 10*3/MM3 (ref 3.4–10.8)

## 2023-07-24 PROCEDURE — 25010000002 ENOXAPARIN PER 10 MG: Performed by: FAMILY MEDICINE

## 2023-07-24 PROCEDURE — 25010000002 CEFTRIAXONE SODIUM-DEXTROSE 1000-3.74 MG-%(50ML) RECONSTITUTED SOLUTION: Performed by: FAMILY MEDICINE

## 2023-07-24 PROCEDURE — 80048 BASIC METABOLIC PNL TOTAL CA: CPT | Performed by: FAMILY MEDICINE

## 2023-07-24 PROCEDURE — 96372 THER/PROPH/DIAG INJ SC/IM: CPT

## 2023-07-24 PROCEDURE — 84132 ASSAY OF SERUM POTASSIUM: CPT | Performed by: FAMILY MEDICINE

## 2023-07-24 PROCEDURE — 85027 COMPLETE CBC AUTOMATED: CPT | Performed by: FAMILY MEDICINE

## 2023-07-24 PROCEDURE — G0378 HOSPITAL OBSERVATION PER HR: HCPCS

## 2023-07-24 RX ORDER — POLYETHYLENE GLYCOL 3350 17 G/17G
17 POWDER, FOR SOLUTION ORAL DAILY PRN
Status: DISCONTINUED | OUTPATIENT
Start: 2023-07-24 | End: 2023-07-26 | Stop reason: HOSPADM

## 2023-07-24 RX ORDER — ATENOLOL 25 MG/1
25 TABLET ORAL 2 TIMES DAILY PRN
COMMUNITY

## 2023-07-24 RX ORDER — SODIUM CHLORIDE 9 MG/ML
40 INJECTION, SOLUTION INTRAVENOUS AS NEEDED
Status: DISCONTINUED | OUTPATIENT
Start: 2023-07-24 | End: 2023-07-26 | Stop reason: HOSPADM

## 2023-07-24 RX ORDER — BISACODYL 10 MG
10 SUPPOSITORY, RECTAL RECTAL DAILY PRN
Status: DISCONTINUED | OUTPATIENT
Start: 2023-07-24 | End: 2023-07-26 | Stop reason: HOSPADM

## 2023-07-24 RX ORDER — ENOXAPARIN SODIUM 100 MG/ML
40 INJECTION SUBCUTANEOUS EVERY 24 HOURS
Status: DISCONTINUED | OUTPATIENT
Start: 2023-07-24 | End: 2023-07-26 | Stop reason: HOSPADM

## 2023-07-24 RX ORDER — ONDANSETRON 2 MG/ML
4 INJECTION INTRAMUSCULAR; INTRAVENOUS EVERY 6 HOURS PRN
Status: DISCONTINUED | OUTPATIENT
Start: 2023-07-24 | End: 2023-07-26 | Stop reason: HOSPADM

## 2023-07-24 RX ORDER — HYDROCODONE BITARTRATE AND ACETAMINOPHEN 5; 325 MG/1; MG/1
1 TABLET ORAL EVERY 8 HOURS PRN
Status: DISCONTINUED | OUTPATIENT
Start: 2023-07-24 | End: 2023-07-26 | Stop reason: HOSPADM

## 2023-07-24 RX ORDER — POTASSIUM CHLORIDE 20 MEQ/1
40 TABLET, EXTENDED RELEASE ORAL EVERY 4 HOURS
Status: COMPLETED | OUTPATIENT
Start: 2023-07-24 | End: 2023-07-24

## 2023-07-24 RX ORDER — AMOXICILLIN 250 MG
2 CAPSULE ORAL 2 TIMES DAILY
Status: DISCONTINUED | OUTPATIENT
Start: 2023-07-24 | End: 2023-07-26 | Stop reason: HOSPADM

## 2023-07-24 RX ORDER — SODIUM CHLORIDE 9 MG/ML
100 INJECTION, SOLUTION INTRAVENOUS CONTINUOUS
Status: DISCONTINUED | OUTPATIENT
Start: 2023-07-24 | End: 2023-07-26

## 2023-07-24 RX ORDER — ACETAMINOPHEN 650 MG/1
650 SUPPOSITORY RECTAL EVERY 4 HOURS PRN
Status: DISCONTINUED | OUTPATIENT
Start: 2023-07-24 | End: 2023-07-26 | Stop reason: HOSPADM

## 2023-07-24 RX ORDER — ENOXAPARIN SODIUM 100 MG/ML
40 INJECTION SUBCUTANEOUS EVERY 24 HOURS
Status: DISCONTINUED | OUTPATIENT
Start: 2023-07-24 | End: 2023-07-24

## 2023-07-24 RX ORDER — SODIUM CHLORIDE 0.9 % (FLUSH) 0.9 %
10 SYRINGE (ML) INJECTION EVERY 12 HOURS SCHEDULED
Status: DISCONTINUED | OUTPATIENT
Start: 2023-07-24 | End: 2023-07-26 | Stop reason: HOSPADM

## 2023-07-24 RX ORDER — BISACODYL 5 MG/1
5 TABLET, DELAYED RELEASE ORAL DAILY PRN
Status: DISCONTINUED | OUTPATIENT
Start: 2023-07-24 | End: 2023-07-26 | Stop reason: HOSPADM

## 2023-07-24 RX ORDER — NITROGLYCERIN 0.4 MG/1
0.4 TABLET SUBLINGUAL
Status: DISCONTINUED | OUTPATIENT
Start: 2023-07-24 | End: 2023-07-26 | Stop reason: HOSPADM

## 2023-07-24 RX ORDER — CHOLECALCIFEROL (VITAMIN D3) 125 MCG
5 CAPSULE ORAL NIGHTLY PRN
Status: DISCONTINUED | OUTPATIENT
Start: 2023-07-24 | End: 2023-07-26 | Stop reason: HOSPADM

## 2023-07-24 RX ORDER — SODIUM CHLORIDE 0.9 % (FLUSH) 0.9 %
10 SYRINGE (ML) INJECTION AS NEEDED
Status: DISCONTINUED | OUTPATIENT
Start: 2023-07-24 | End: 2023-07-26 | Stop reason: HOSPADM

## 2023-07-24 RX ORDER — ACETAMINOPHEN 325 MG/1
650 TABLET ORAL EVERY 4 HOURS PRN
Status: DISCONTINUED | OUTPATIENT
Start: 2023-07-24 | End: 2023-07-26 | Stop reason: HOSPADM

## 2023-07-24 RX ORDER — CEFTRIAXONE 1 G/50ML
1000 INJECTION, SOLUTION INTRAVENOUS EVERY 24 HOURS
Status: DISCONTINUED | OUTPATIENT
Start: 2023-07-24 | End: 2023-07-26 | Stop reason: HOSPADM

## 2023-07-24 RX ORDER — ACETAMINOPHEN 160 MG/5ML
650 SOLUTION ORAL EVERY 4 HOURS PRN
Status: DISCONTINUED | OUTPATIENT
Start: 2023-07-24 | End: 2023-07-26 | Stop reason: HOSPADM

## 2023-07-24 RX ORDER — ATENOLOL 25 MG/1
25 TABLET ORAL
Status: DISCONTINUED | OUTPATIENT
Start: 2023-07-24 | End: 2023-07-26 | Stop reason: HOSPADM

## 2023-07-24 RX ADMIN — Medication 10 ML: at 08:11

## 2023-07-24 RX ADMIN — Medication 10 ML: at 20:50

## 2023-07-24 RX ADMIN — POTASSIUM CHLORIDE 40 MEQ: 1500 TABLET, EXTENDED RELEASE ORAL at 11:40

## 2023-07-24 RX ADMIN — SODIUM CHLORIDE 100 ML/HR: 9 INJECTION, SOLUTION INTRAVENOUS at 02:12

## 2023-07-24 RX ADMIN — POTASSIUM CHLORIDE 40 MEQ: 1500 TABLET, EXTENDED RELEASE ORAL at 08:11

## 2023-07-24 RX ADMIN — ATENOLOL 25 MG: 25 TABLET ORAL at 08:11

## 2023-07-24 RX ADMIN — ENOXAPARIN SODIUM 40 MG: 100 INJECTION SUBCUTANEOUS at 08:11

## 2023-07-24 RX ADMIN — CEFTRIAXONE 1000 MG: 1 INJECTION, SOLUTION INTRAVENOUS at 20:50

## 2023-07-24 RX ADMIN — ACETAMINOPHEN 650 MG: 325 TABLET, FILM COATED ORAL at 04:26

## 2023-07-24 RX ADMIN — SENNOSIDES AND DOCUSATE SODIUM 2 TABLET: 50; 8.6 TABLET ORAL at 20:50

## 2023-07-24 NOTE — H&P
[facility] HOSPITALIST   HISTORY AND PHYSICAL      Name:  Tonya Richardson   Age:  38 y.o.  Sex:  female  :  1985  MRN:  [id number]   Visit Number:  [id number]  Admission Date:  2023  Date Of Service:  23  Primary Care Physician:  Gold Jordan APRN    Chief Complaint:     Right flank pain    History Of Presenting Illness:      Patient is a 38 years old female with a past medical history of IV illicit drugs abuse and hepatitis C who presented to the ER with a chief complaint of right flank pain.  Patient reports her right flank started hurting around 1 week ago, associated with symptoms of fever, chills, body aches and nausea.  No vomiting or changes in her bowel movements.  Patient reporting also urinary urgency and unable to empty her bladder.  Her last period was 3 weeks ago.  She has a history of recurrent UTIs in the past.  Patient quit smoking over 6 months ago, admitted using nasal methamphetamine 4 or 5 days ago.    On ER evaluation, Patient was tachycardic with a heart rate of 120s, afebrile, hemodynamically stable on the blood pressure.  Her work-up was notable for WBC of 17.0, hemoglobin 10.8.  Urinalysis negative for nitrates but showed leukocytes 3+ with WBC of 6-12 and 2+ bacteria.  UDS positive for meth and cocaine.  Strep, COVID and flu negative.  Lactate and lipase WNL.  CTA chest with no PE.  CT abdomen pelvis showed right pyelonephritis.  Patient received total of 3 L normal saline boluses while in the ER and was started on Rocephin.  Hospitalist consulted for admission and treatment.    Review Of Systems:    All systems were reviewed and negative except as mentioned in history of presenting illness, assessment and plan.    Past Medical History: Patient  has a past medical history of Hepatitis C.    Past Surgical History: Patient  has no past surgical history on file.    Social History: Patient  reports that she has been smoking cigarettes. She has been smoking an  "average of .5 packs per day. She has never used smokeless tobacco. She reports current alcohol use. She reports current drug use. Drug: Methamphetamines.    Family History:  Patient's family history has been reviewed and found to be noncontributory.     Allergies:      Phenergan [promethazine hcl]    Home Medications:    Prior to Admission Medications       Prescriptions Last Dose Informant Patient Reported? Taking?    albuterol sulfate  (90 Base) MCG/ACT inhaler   No No    Inhale 2 puffs Every 4 (Four) Hours As Needed for Wheezing.    cefdinir (OMNICEF) 300 MG capsule   No No    Take 1 capsule by mouth 2 (Two) Times a Day.    Etonogestrel (Nexplanon) 68 MG implant subdermal implant   Yes No    Inject 1 each into the appropriate area of the skin as directed by provider 1 (One) Time.    methadone (DOLOPHINE) 10 MG/ML solution   Yes No    Take 65 mg by mouth Every 6 (Six) Hours As Needed for Moderate Pain .          ED Medications:    Medications   sodium chloride 0.9 % flush 10 mL (has no administration in time range)   sodium chloride 0.9 % flush 10 mL (has no administration in time range)   sodium chloride 0.9 % flush 10 mL (has no administration in time range)   sodium chloride 0.9 % bolus 2,000 mL (has no administration in time range)   sodium chloride 0.9 % bolus 1,000 mL (0 mL Intravenous Stopped 7/23/23 2018)   cefTRIAXone (ROCEPHIN) IVPB 1000 mg/50ml dextrose (premix) (0 mg Intravenous Stopped 7/23/23 2018)   iopamidol (ISOVUE-300) 61 % injection 100 mL (80 mL Intravenous Given 7/23/23 2104)     Vital Signs:  Temp:  [98.9 °F (37.2 °C)-99.7 °F (37.6 °C)] 99.7 °F (37.6 °C)  Heart Rate:  [106-120] 106  Resp:  [19] 19  BP: (104-118)/(69-71) 104/69        07/23/23 1812   Weight: 54.4 kg (120 lb)     Body mass index is 19.97 kg/m².    Physical Exam:     Most recent vital Signs: /69   Pulse 106   Temp 99.7 °F (37.6 °C) (Oral)   Resp 19   Ht 165.1 cm (65\")   Wt 54.4 kg (120 lb)   LMP 07/02/2023 " (Approximate)   SpO2 99%   BMI 19.97 kg/m²     Physical Exam  Vitals and nursing note reviewed.   Constitutional:       General: She is not in acute distress.     Appearance: She is ill-appearing.   HENT:      Head: Normocephalic and atraumatic.      Right Ear: External ear normal.      Left Ear: External ear normal.      Nose: Nose normal.      Mouth/Throat:      Mouth: Mucous membranes are dry.   Eyes:      Extraocular Movements: Extraocular movements intact.      Conjunctiva/sclera: Conjunctivae normal.      Pupils: Pupils are equal, round, and reactive to light.   Cardiovascular:      Rate and Rhythm: Regular rhythm. Tachycardia present.      Pulses: Normal pulses.      Heart sounds: Normal heart sounds.   Pulmonary:      Effort: Pulmonary effort is normal. No respiratory distress.      Breath sounds: Normal breath sounds. No wheezing or rhonchi.   Abdominal:      General: Bowel sounds are normal. There is no distension.      Palpations: Abdomen is soft.      Tenderness: There is no abdominal tenderness. There is right CVA tenderness. There is no left CVA tenderness, guarding or rebound.   Musculoskeletal:         General: Normal range of motion.      Cervical back: Normal range of motion and neck supple.      Right lower leg: No edema.      Left lower leg: No edema.   Skin:     General: Skin is warm and dry.      Findings: No rash.   Neurological:      General: No focal deficit present.      Mental Status: She is alert and oriented to person, place, and time. Mental status is at baseline.      Motor: No weakness.   Psychiatric:         Mood and Affect: Mood normal.         Behavior: Behavior normal.         Thought Content: Thought content normal.       Laboratory data:    I have reviewed the labs done in the emergency room.    Results from last 7 days   Lab Units 07/23/23  1900   SODIUM mmol/L 136   POTASSIUM mmol/L 3.5   CHLORIDE mmol/L 100   CO2 mmol/L 24.2   BUN mg/dL 9   CREATININE mg/dL 0.64   CALCIUM  mg/dL 8.4*   BILIRUBIN mg/dL 0.3   ALK PHOS U/L 237*   ALT (SGPT) U/L 21   AST (SGOT) U/L 15   GLUCOSE mg/dL 96     Results from last 7 days   Lab Units 07/23/23  1900   WBC 10*3/mm3 17.09*   HEMOGLOBIN g/dL 10.8*   HEMATOCRIT % 31.2*   PLATELETS 10*3/mm3 331                     Results from last 7 days   Lab Units 07/23/23  1900   LIPASE U/L 12*         Results from last 7 days   Lab Units 07/23/23  1841   COLOR UA  Yellow   GLUCOSE UA  Negative   KETONES UA  Negative   LEUKOCYTES UA  Large (3+)*   PH, URINE  7.5   BILIRUBIN UA  Negative   UROBILINOGEN UA  1.0 E.U./dL   RBC UA /HPF 0-2*   WBC UA /HPF 6-12*       Pain Management Panel  More data exists         Latest Ref Rng & Units 7/23/2023 12/25/2022   Pain Management Panel   Amphetamine, Urine Qual Negative Positive  Positive    Barbiturates Screen, Urine Negative Negative  Negative    Benzodiazepine Screen, Urine Negative Negative  Negative    Buprenorphine, Screen, Urine Negative Negative  Negative    Cocaine Screen, Urine Negative Positive  Negative    Methadone Screen , Urine Negative Negative  Negative    Methamphetamine, Ur Negative Positive  Positive        EKG:      Sinus tachycardia heart rate 114, normal axis, nonspecific ST/T wave changes.    Radiology:    CT Abdomen Pelvis Without Contrast    Result Date: 7/23/2023  FINAL REPORT TECHNIQUE: Axial CT images were performed from the lung bases through the symphysis pubis without IV contrast.  This study was performed with techniques to keep radiation doses as low as reasonably achievable (ALARA). Individualized dose reduction techniques using automated exposure control or adjustment of mA and/or kV according to the patient's size were employed. CLINICAL HISTORY: pyelo, r/o stone FINDINGS: Lack of intravenous contrast limits evaluation of solid abdominal and pelvic organs.  ABDOMEN/PELVIS:  Liver, gallbladder and bile ducts: The unenhanced liver is grossly unremarkable without focal abnormality.  The  gallbladder is unremarkable.  There is no definite biliary duct dilatation. Adrenal glands: The adrenal glands are morphologically unremarkable without suspicious lesion.  Kidneys, ureter and urinary bladder: Radiopaque contrast is seen within the renal collecting system, ureters, and urinary bladder.  Kidney stones cannot be excluded on this exam.  There is striated right renal nephrogram with right perinephric inflammation.  Spleen: The spleen is normal size.  Pancreas: The pancreas is grossly unremarkable.  GI systems and mesentery: No evidence of bowel obstruction.  The appendix is visualized and unremarkable in appearance.  No significant mesenteric inflammation.  Lymph nodes: No definite pathologically enlarged abdominal or pelvic lymph nodes present within the limits of this noncontrast enhanced exam.  Vessels: The abdominal aorta is normal in caliber.  The inferior vena cava is unremarkable.  Peritoneum: No free intraperitoneal fluid or pneumoperitoneum.  Pelvic viscera: No acute findings.  Body wall: No body wall contusion. Bones: No acute fracture.     Right pyelonephritis. Authenticated and Electronically Signed by Jacky Loo MD on 07/23/2023 11:16:49 PM    CT Angiogram Chest Pulmonary Embolism    Result Date: 7/23/2023  FINAL REPORT TECHNIQUE: Multiple axial CT images were obtained through the chest following IV contrast using a CTA/PE protocol.  3D/MIP reconstruction images were also performed. This study was performed with techniques to keep radiation doses as low as reasonably achievable (ALARA). Individualized dose reduction techniques using automated exposure control or adjustment of mA and/or kV according to the patient's size were employed. CLINICAL HISTORY: Pulmonary embolism (PE) suspected, high prob FINDINGS: PAs and aorta: No pulmonary embolus.  Thoracic aorta is unremarkable.  No significant coronary artery disease. Heart/mediastinum: No evidence for right heart strain.  No pericardial  effusion.  The heart is normal in size.  Lungs: Mild atelectasis.  Otherwise the lungs are clear.  Lymph nodes: No pathologically enlarged thoracic lymph nodes.  Pleura: No pneumothorax or pleural effusion.  Chest Wall: No chest wall contusion.  Bones: No acute fracture.  Upper abdomen: The right kidney is enlarged and demonstrates areas of diminished renal cortical enhancement with surrounding perinephric inflammation.     No pulmonary embolus.   Right pyelonephritis. Authenticated and Electronically Signed by Jacky Loo MD on 07/23/2023 10:19:31 PM     Assessment:    Right pyelonephritis/acute UTI, POA  Sepsis, secondary to #2, POA  Illicit drug abuse  Hepatitis C  Hypertension    Plan:    Patient is admitted to telemetry for further management and treatment.    Right polynephritis/sepsis  -Received IV fluid boluses in the ED, continue with maintenance IV fluids  -Continue patient on Rocephin.  -Follow-up on blood and urine cultures.  -Hold urine cultures on file from 2022 showed E. coli and was sensitive to Rocephin.  -Pain control, antiemetics and supportive care.    Illicit drug abuse  -UDS positive for methamphetamine and cocaine.    Hypertension  -Patient reports that she takes atenolol 25 mg twice daily as needed?  Might also explain her tachycardia?  -Will continue with atenolol 25 mg daily.    Further orders as indicated per clinical course.    Risk Assessment: Moderate to high  DVT Prophylaxis: Lovenox prophylaxis  Code Status: Full  Diet: Cardiac    Advance Care Planning   ACP discussion was held with the patient during this visit. Patient does not have an advance directive, information provided.       Dara Garduno MD  07/23/23  23:24 EDT    Dictated utilizing Dragon dictation.

## 2023-07-24 NOTE — CASE MANAGEMENT/SOCIAL WORK
"Discharge Planning Assessment  [facility]     Patient Name: Tonya Richardson  MRN: [id number]  Today's Date: 2023    Admit Date: 2023    Plan: Plans to DC home. Denies any needs or concerns for discharge.   Discharge Needs Assessment       Row Name 23 1523       Living Environment    People in Home alone    Current Living Arrangements apartment    Potentially Unsafe Housing Conditions none    Primary Care Provided by self    Provides Primary Care For no one    Family Caregiver if Needed none    Quality of Family Relationships unable to assess    Able to Return to Prior Arrangements yes       Resource/Environmental Concerns    Resource/Environmental Concerns none    Transportation Concerns none       Food Insecurity    Within the past 12 months, you worried that your food would run out before you got the money to buy more. Never true    Within the past 12 months, the food you bought just didn't last and you didn't have money to get more. Never true       Transition Planning    Patient/Family Anticipates Transition to home    Patient/Family Anticipated Services at Transition none    Transportation Anticipated family or friend will provide       Discharge Needs Assessment    Readmission Within the Last 30 Days no previous admission in last 30 days    Equipment Currently Used at Home bp cuff;scales    Concerns to be Addressed denies needs/concerns at this time    Equipment Needed After Discharge none    Provided Post Acute Provider List? N/A    Provided Post Acute Provider Quality & Resource List? N/A                   Discharge Plan       Row Name 23 1527       Plan    Plan Plans to DC home. Denies any needs or concerns for discharge.    Plan Comments Cm spoke with pt. at bedside to discuss DCP. Pt. confirmed name, , address, insurance, and phone number. No LW/POA. PCP confirmed and last seen \"about one month ago.\" Pharmacy used [facility], Agreed to meds to bed.States was (I) in all ADL's and " mobility before getting sick. Lives alone and has 12 steps into apartment, 0 steps inside.Denies any needs/equipment at this time. States friend Gianluca will transport home.Medicaid Extra benefits/services list given to pt.    Final Discharge Disposition Code 01 - home or self-care                  Continued Care and Services - Admitted Since 7/23/2023    Coordination has not been started for this encounter.          Demographic Summary       Row Name 07/24/23 1520       General Information    Admission Type inpatient    Arrived From emergency department    Referral Source admission list    Reason for Consult discharge planning    Preferred Language English       Contact Information    Permission Granted to Share Info With     Contact Information Obtained for                    Functional Status       Row Name 07/24/23 1521       Functional Status    Usual Activity Tolerance moderate    Current Activity Tolerance moderate       Physical Activity    On average, how many days per week do you engage in moderate to strenuous exercise (like a brisk walk)? 5 days    On average, how many minutes do you engage in exercise at this level? 20 min    Number of minutes of exercise per week 100       Assessment of Health Literacy    How often do you have someone help you read hospital materials? Never    How often do you have problems learning about your medical condition because of difficulty understanding written information? Never    How often do you have a problem understanding what is told to you about your medical condition? Occasionally    How confident are you filling out medical forms by yourself? Quite a bit    Health Literacy Good       Functional Status, IADL    Medications independent    Meal Preparation independent    Housekeeping independent    Laundry independent    Shopping independent       Mental Status    General Appearance WDL WDL       Mental Status Summary    Recent Changes in Mental  Status/Cognitive Functioning no changes       Employment/    Employment Status unemployed                   Psychosocial       Row Name 07/24/23 1523       Developmental Stage (Eriksson's)    Developmental Stage Stage 7 (35-65 years/Middle Adulthood) Generativity vs. Stagnation                   Abuse/Neglect    No documentation.                  Legal    No documentation.                  Substance Abuse    No documentation.                  Patient Forms    No documentation.                     Selene Sanchez RN

## 2023-07-24 NOTE — PROGRESS NOTES
[facility] HOSPITALIST    PROGRESS NOTE    Name:  Tonya Richardson   Age:  38 y.o.  Sex:  female  :  1985  MRN:  [id number]   Visit Number:  [id number]  Admission Date:  2023  Date Of Service:  23  Primary Care Physician:  Gold Jordan APRN     LOS: 1 day :    Chief Complaint:      Right Flank Pain    Subjective:    Patient seen and examined at bedside this morning after reviewing admission documentation.  She is found resting in bed.  There is no family present at time of exam.  She states she is feeling somewhat better today.  Started feeling bad 4-5 days ago and endorses fevers at home.      Hospital Course:    Patient is a 38 years old female with a past medical history of IV illicit drugs abuse and hepatitis C who presented to the ER with a chief complaint of right flank pain.  Patient reports her right flank started hurting around 1 week ago, associated with symptoms of fever, chills, body aches and nausea.  No vomiting or changes in her bowel movements.  Patient reporting also urinary urgency and unable to empty her bladder.  Her last period was 3 weeks ago.  She has a history of recurrent UTIs in the past.  Patient quit smoking over 6 months ago, admitted using nasal methamphetamine 4 or 5 days ago.     On ER evaluation, Patient was tachycardic with a heart rate of 120s, afebrile, hemodynamically stable on the blood pressure.  Her work-up was notable for WBC of 17.0, hemoglobin 10.8.  Urinalysis negative for nitrates but showed leukocytes 3+ with WBC of 6-12 and 2+ bacteria.  UDS positive for meth and cocaine.  Strep, COVID and flu negative.  Lactate and lipase WNL.  CTA chest with no PE.  CT abdomen pelvis showed right pyelonephritis.  Patient received total of 3 L normal saline boluses while in the ER and was started on Rocephin.  Hospitalist consulted for admission and treatment.     Review Of Systems:    Review of Systems:     All systems were reviewed and negative except  as mentioned in subjective, assessment and plan.    Vital Signs:    Temp:  [98.3 °F (36.8 °C)-100.2 °F (37.9 °C)] 98.4 °F (36.9 °C)  Heart Rate:  [] 95  Resp:  [16-19] 16  BP: ()/(60-82) 95/60    Intake and output:    I/O last 3 completed shifts:  In: 1050 [IV Piggyback:1050]  Out: 1450 [Urine:1450]  I/O this shift:  In: 120 [P.O.:120]  Out: 1900 [Urine:1900]    Physical Examination:    General Appearance:  Alert and cooperative, pleasant middle aged female, no acute distress on exam   Head:  Atraumatic and normocephalic.   Eyes: Conjunctivae and sclerae normal, no icterus. No pallor.   Throat: No oral lesions, no thrush, oral mucosa moist.   Neck: Supple, trachea midline   Lungs:   Breath sounds heard bilaterally equally.  No wheezing or crackles. Unlabored on room air   Heart:  Normal S1 and S2, tachycardic, no murmur, no gallop, no rub. No JVD.   Abdomen:   Normal bowel sounds, no masses, no organomegaly. Soft, nontender, nondistended, no rebound tenderness, right CVA tenderness noted   Extremities: Supple, no edema, no cyanosis, no clubbing.   Skin: No bleeding or rash.   Neurologic: Alert and oriented x 3. No facial asymmetry. Moves all four limbs. No tremors.      Laboratory results:    Results from last 7 days   Lab Units 07/24/23  0604 07/23/23  1900   SODIUM mmol/L 137 136   POTASSIUM mmol/L 3.4* 3.5   CHLORIDE mmol/L 105 100   CO2 mmol/L 21.3* 24.2   BUN mg/dL 7 9   CREATININE mg/dL 0.60 0.64   CALCIUM mg/dL 7.9* 8.4*   BILIRUBIN mg/dL  --  0.3   ALK PHOS U/L  --  237*   ALT (SGPT) U/L  --  21   AST (SGOT) U/L  --  15   GLUCOSE mg/dL 91 96     Results from last 7 days   Lab Units 07/24/23  0604 07/23/23  1900   WBC 10*3/mm3 12.80* 17.09*   HEMOGLOBIN g/dL 9.7* 10.8*   HEMATOCRIT % 28.5* 31.2*   PLATELETS 10*3/mm3 340 331                 No results for input(s): PHART, APK2YIX, PO2ART, JOG0OEX, BASEEXCESS in the last 8760 hours.   I have reviewed the patient's laboratory results.    Radiology  results:    CT Abdomen Pelvis Without Contrast    Result Date: 7/23/2023  FINAL REPORT TECHNIQUE: Axial CT images were performed from the lung bases through the symphysis pubis without IV contrast.  This study was performed with techniques to keep radiation doses as low as reasonably achievable (ALARA). Individualized dose reduction techniques using automated exposure control or adjustment of mA and/or kV according to the patient's size were employed. CLINICAL HISTORY: pyelo, r/o stone FINDINGS: Lack of intravenous contrast limits evaluation of solid abdominal and pelvic organs.  ABDOMEN/PELVIS:  Liver, gallbladder and bile ducts: The unenhanced liver is grossly unremarkable without focal abnormality.  The gallbladder is unremarkable.  There is no definite biliary duct dilatation. Adrenal glands: The adrenal glands are morphologically unremarkable without suspicious lesion.  Kidneys, ureter and urinary bladder: Radiopaque contrast is seen within the renal collecting system, ureters, and urinary bladder.  Kidney stones cannot be excluded on this exam.  There is striated right renal nephrogram with right perinephric inflammation.  Spleen: The spleen is normal size.  Pancreas: The pancreas is grossly unremarkable.  GI systems and mesentery: No evidence of bowel obstruction.  The appendix is visualized and unremarkable in appearance.  No significant mesenteric inflammation.  Lymph nodes: No definite pathologically enlarged abdominal or pelvic lymph nodes present within the limits of this noncontrast enhanced exam.  Vessels: The abdominal aorta is normal in caliber.  The inferior vena cava is unremarkable.  Peritoneum: No free intraperitoneal fluid or pneumoperitoneum.  Pelvic viscera: No acute findings.  Body wall: No body wall contusion. Bones: No acute fracture.     Impression: Right pyelonephritis. Authenticated and Electronically Signed by Jacky Loo MD on 07/23/2023 11:16:49 PM    XR Chest 1 View    Result Date:  7/24/2023  CLINICAL INDICATION:  Fever, tachycardia  EXAMINATION TECHNIQUE: XR CHEST 1 VW-  COMPARISON: None.  FINDINGS: Lungs are clear without focal airspace consolidation. No pneumothorax or large pleural effusion. Heart and mediastinal contours are within normal limits. No acute osseous or soft tissue abnormality. No free air under hemidiaphragms.      Impression: No acute cardiopulmonary findings.  Images personally reviewed, interpreted and dictated by LES Jolley.       This report was signed and finalized on 7/24/2023 7:49 AM by LES Jolley.    CT Angiogram Chest Pulmonary Embolism    Result Date: 7/23/2023  FINAL REPORT TECHNIQUE: Multiple axial CT images were obtained through the chest following IV contrast using a CTA/PE protocol.  3D/MIP reconstruction images were also performed. This study was performed with techniques to keep radiation doses as low as reasonably achievable (ALARA). Individualized dose reduction techniques using automated exposure control or adjustment of mA and/or kV according to the patient's size were employed. CLINICAL HISTORY: Pulmonary embolism (PE) suspected, high prob FINDINGS: PAs and aorta: No pulmonary embolus.  Thoracic aorta is unremarkable.  No significant coronary artery disease. Heart/mediastinum: No evidence for right heart strain.  No pericardial effusion.  The heart is normal in size.  Lungs: Mild atelectasis.  Otherwise the lungs are clear.  Lymph nodes: No pathologically enlarged thoracic lymph nodes.  Pleura: No pneumothorax or pleural effusion.  Chest Wall: No chest wall contusion.  Bones: No acute fracture.  Upper abdomen: The right kidney is enlarged and demonstrates areas of diminished renal cortical enhancement with surrounding perinephric inflammation.     Impression: No pulmonary embolus.   Right pyelonephritis. Authenticated and Electronically Signed by Jacky Loo MD on 07/23/2023 10:19:31 PM   I have reviewed the patient's radiology  reports.    Medication Review:     I have reviewed the patient's active and prn medications.     Problem List:      Pyelonephritis      Assessment:    Right pyelonephritis/acute UTI, POA  Sepsis, secondary to #2, POA  Illicit drug abuse  Hepatitis C  Hypertension    Plan:    Right polynephritis/sepsis  -Received IV fluid boluses in the ED, continue with maintenance IV fluids  -Continue patient on Rocephin for noq  -Follow blood cultures and urine cultures  -Procal-4 on admission  -Past urine cultures on file from 2022 showed E. coli and was sensitive to Rocephin.  -Pain control, antiemetics and supportive care.     Illicit drug abuse  -UDS positive for methamphetamine and cocaine.     Hypertension  -Patient reports that she takes atenolol 25 mg twice daily as needed??   -Will continue with atenolol 25 mg daily.    DVT Prophylaxis: Lovenox  Code Status: Full Code  Diet: Cardiac  Discharge Plan: 2-3 days    Mariya Hemphill, APRN  07/24/23  12:20 EDT    Dictated utilizing Dragon dictation.

## 2023-07-24 NOTE — PLAN OF CARE
Goal Outcome Evaluation:  Plan of Care Reviewed With: patient        Progress: no change  Outcome Evaluation: PT LYING IN BED RESTING COMFORTABLY AT THIS TIME. NO ACUTE EVENTS SINCE ADMISSION. IVF INFUSING WELL. VSS. WILL CONTINUE TO MONITOR.

## 2023-07-24 NOTE — PLAN OF CARE
Goal Outcome Evaluation:      VSS. Patient on room air. K replaced today. Medications and fluids administered per orders. Patient has rested today.

## 2023-07-24 NOTE — ED PROVIDER NOTES
Subjective  History of Present Illness:    Chief Complaint: Fever, headache, body aches, hallucinations, right-sided pain x1 week  History of Present Illness: This is a 38-year-old female patient comes into the ED today complaining of fever, headache, body aches, hallucinations and right-sided flank pain.  Patient states she is a recovering IV drug user.      Nurses Notes reviewed and agree, including vitals, allergies, social history and prior medical history.       Allergies:    Phenergan [promethazine hcl]      History reviewed. No pertinent surgical history.      Social History     Socioeconomic History    Marital status:    Tobacco Use    Smoking status: Every Day     Packs/day: 0.50     Types: Cigarettes    Smokeless tobacco: Never   Substance and Sexual Activity    Alcohol use: Yes     Comment: on occasion    Drug use: Yes     Types: Methamphetamines     Comment: former user    Sexual activity: Defer         History reviewed. No pertinent family history.    REVIEW OF SYSTEMS: All systems reviewed and not pertinent unless noted.    Review of Systems   Constitutional:  Positive for fatigue and fever.   Neurological:  Positive for weakness and headaches.   Psychiatric/Behavioral:  Positive for confusion.      Objective    Physical Exam  Vitals and nursing note reviewed.   Constitutional:       Appearance: Normal appearance.   HENT:      Head: Normocephalic and atraumatic.   Eyes:      Extraocular Movements: Extraocular movements intact.      Pupils: Pupils are equal, round, and reactive to light.   Cardiovascular:      Rate and Rhythm: Regular rhythm. Tachycardia present.      Pulses: Normal pulses.      Heart sounds: Normal heart sounds.   Pulmonary:      Effort: Pulmonary effort is normal.      Breath sounds: Normal breath sounds.      Comments: Decreased in the bases bilaterally scattered wheezing  Abdominal:      General: Abdomen is flat. Bowel sounds are normal.      Palpations: Abdomen is soft.       Comments: Mild tenderness palpation right flank   Musculoskeletal:      Cervical back: Normal range of motion and neck supple.   Skin:     Capillary Refill: Capillary refill takes less than 2 seconds.   Neurological:      General: No focal deficit present.      Mental Status: She is alert and oriented to person, place, and time. Mental status is at baseline.      GCS: GCS eye subscore is 4. GCS verbal subscore is 5. GCS motor subscore is 6.      Sensory: Sensation is intact.      Motor: Motor function is intact.      Gait: Gait is intact.   Psychiatric:         Attention and Perception: Attention and perception normal.         Mood and Affect: Mood and affect normal.         Speech: Speech normal.         Behavior: Behavior normal. Behavior is cooperative.         Procedures    ED Course:    ED Course as of 07/23/23 2323   Sun Jul 23, 2023 2157 CT a of chest shows no pulmonary emboli but does show right pyelonephritis. [KH]      ED Course User Index  [KH] Naseem Rodriguez APRN       Lab Results (last 24 hours)       Procedure Component Value Units Date/Time    COVID-19 and FLU A/B PCR - Swab, Nasopharynx [[id number]]  (Normal) Collected: 07/23/23 1825    Specimen: Swab from Nasopharynx Updated: 07/23/23 1849     COVID19 Not Detected     Influenza A PCR Not Detected     Influenza B PCR Not Detected    Narrative:      Fact sheet for providers: https://www.fda.gov/media/780213/download    Fact sheet for patients: https://www.fda.gov/media/987618/download    Test performed by PCR.    Urinalysis With Microscopic If Indicated (No Culture) - Urine, Clean Catch [[id number]]  (Abnormal) Collected: 07/23/23 1841    Specimen: Urine, Clean Catch Updated: 07/23/23 1905     Color, UA Yellow     Appearance, UA Cloudy     pH, UA 7.5     Specific Gravity, UA 1.011     Glucose, UA Negative     Ketones, UA Negative     Bilirubin, UA Negative     Blood, UA Small (1+)     Protein, UA 30 mg/dL (1+)     Leuk Esterase, UA Large (3+)      Nitrite, UA Negative     Urobilinogen, UA 1.0 E.U./dL    Rapid Strep A Screen - Swab, Throat [[id number]]  (Normal) Collected: 07/23/23 1841    Specimen: Swab from Throat Updated: 07/23/23 1855     Strep A Ag Negative    Beta Strep Culture, Throat - Swab, Throat [[id number]] Collected: 07/23/23 1841    Specimen: Swab from Throat Updated: 07/23/23 1855    Urinalysis, Microscopic Only - Urine, Clean Catch [[id number]]  (Abnormal) Collected: 07/23/23 1841    Specimen: Urine, Clean Catch Updated: 07/23/23 1915     RBC, UA 0-2 /HPF      WBC, UA 6-12 /HPF      Bacteria, UA 2+ /HPF      Squamous Epithelial Cells, UA 7-12 /HPF      Hyaline Casts, UA None Seen /LPF      Methodology Manual Light Microscopy    Urine Drug Screen - Urine, Clean Catch [[id number]]  (Abnormal) Collected: 07/23/23 1841    Specimen: Urine, Clean Catch Updated: 07/23/23 1941     THC, Screen, Urine Negative     Phencyclidine (PCP), Urine Negative     Cocaine Screen, Urine Positive     Methamphetamine, Ur Positive     Opiate Screen Negative     Amphetamine Screen, Urine Positive     Benzodiazepine Screen, Urine Negative     Tricyclic Antidepressants Screen Negative     Methadone Screen, Urine Negative     Barbiturates Screen, Urine Negative     Oxycodone Screen, Urine Negative     Propoxyphene Screen Negative     Buprenorphine, Screen, Urine Negative    Narrative:      Limitations of this procedure include the possibility of false positives due to interfering substances in the urine sample. Clinical data should be correlated with any questionable result. Positive results should be considered Presumptive Positive until results are confirmed with another methodology such as HPLC or GCMS.    CBC & Differential [[id number]]  (Abnormal) Collected: 07/23/23 1900    Specimen: Blood Updated: 07/23/23 1906    Narrative:      The following orders were created for panel order CBC & Differential.  Procedure                               Abnormality         Status                      ---------                               -----------         ------                     CBC Auto Differential[[id number]]        Abnormal            Final result                 Please view results for these tests on the individual orders.    Comprehensive Metabolic Panel [[id number]]  (Abnormal) Collected: 07/23/23 1900    Specimen: Blood Updated: 07/23/23 1927     Glucose 96 mg/dL      BUN 9 mg/dL      Creatinine 0.64 mg/dL      Sodium 136 mmol/L      Potassium 3.5 mmol/L      Chloride 100 mmol/L      CO2 24.2 mmol/L      Calcium 8.4 mg/dL      Total Protein 6.3 g/dL      Albumin 3.0 g/dL      ALT (SGPT) 21 U/L      AST (SGOT) 15 U/L      Alkaline Phosphatase 237 U/L      Total Bilirubin 0.3 mg/dL      Globulin 3.3 gm/dL      A/G Ratio 0.9 g/dL      BUN/Creatinine Ratio 14.1     Anion Gap 11.8 mmol/L      eGFR 115.5 mL/min/1.73     Narrative:      GFR Normal >60  Chronic Kidney Disease <60  Kidney Failure <15      Lipase [[id number]]  (Abnormal) Collected: 07/23/23 1900    Specimen: Blood Updated: 07/23/23 1927     Lipase 12 U/L     CBC Auto Differential [[id number]]  (Abnormal) Collected: 07/23/23 1900    Specimen: Blood Updated: 07/23/23 1906     WBC 17.09 10*3/mm3      RBC 3.42 10*6/mm3      Hemoglobin 10.8 g/dL      Hematocrit 31.2 %      MCV 91.2 fL      MCH 31.6 pg      MCHC 34.6 g/dL      RDW 12.3 %      RDW-SD 41.0 fl      MPV 9.0 fL      Platelets 331 10*3/mm3      Neutrophil % 77.9 %      Lymphocyte % 12.3 %      Monocyte % 8.8 %      Eosinophil % 0.1 %      Basophil % 0.2 %      Immature Grans % 0.7 %      Neutrophils, Absolute 13.31 10*3/mm3      Lymphocytes, Absolute 2.10 10*3/mm3      Monocytes, Absolute 1.51 10*3/mm3      Eosinophils, Absolute 0.02 10*3/mm3      Basophils, Absolute 0.03 10*3/mm3      Immature Grans, Absolute 0.12 10*3/mm3      nRBC 0.0 /100 WBC     Blood Culture - Blood, Arm, Left [[id number]] Collected: 07/23/23 1937    Specimen: Blood from Arm, Left Updated: 07/23/23 1947  "   Blood Culture - Blood, Hand, Right [[id number]] Collected: 07/23/23 1941    Specimen: Blood from Hand, Right Updated: 07/23/23 1947    Lactic Acid, Plasma [[id number]]  (Normal) Collected: 07/23/23 2002    Specimen: Blood Updated: 07/23/23 2024     Lactate 0.7 mmol/L     Procalcitonin [[id number]]  (Abnormal) Collected: 07/23/23 2225    Specimen: Blood Updated: 07/23/23 2256     Procalcitonin 4.21 ng/mL     Narrative:      As a Marker for Sepsis (Non-Neonates):    1. <0.5 ng/mL represents a low risk of severe sepsis and/or septic shock.  2. >2 ng/mL represents a high risk of severe sepsis and/or septic shock.    As a Marker for Lower Respiratory Tract Infections that require antibiotic therapy:    PCT on Admission    Antibiotic Therapy       6-12 Hrs later    >0.5                Strongly Recommended  >0.25 - <0.5        Recommended   0.1 - 0.25          Discouraged              Remeasure/reassess PCT  <0.1                Strongly Discouraged     Remeasure/reassess PCT    As 28 day mortality risk marker: \"Change in Procalcitonin Result\" (>80% or <=80%) if Day 0 (or Day 1) and Day 4 values are available. Refer to http://www.[facility]-pct-calculator.com    Change in PCT <=80%  A decrease of PCT levels below or equal to 80% defines a positive change in PCT test result representing a higher risk for 28-day all-cause mortality of patients diagnosed with severe sepsis for septic shock.    Change in PCT >80%  A decrease of PCT levels of more than 80% defines a negative change in PCT result representing a lower risk for 28-day all-cause mortality of patients diagnosed with severe sepsis or septic shock.                CT Abdomen Pelvis Without Contrast    Result Date: 7/23/2023  FINAL REPORT TECHNIQUE: Axial CT images were performed from the lung bases through the symphysis pubis without IV contrast.  This study was performed with techniques to keep radiation doses as low as reasonably achievable (ALARA). Individualized dose " reduction techniques using automated exposure control or adjustment of mA and/or kV according to the patient's size were employed. CLINICAL HISTORY: pyelo, r/o stone FINDINGS: Lack of intravenous contrast limits evaluation of solid abdominal and pelvic organs.  ABDOMEN/PELVIS:  Liver, gallbladder and bile ducts: The unenhanced liver is grossly unremarkable without focal abnormality.  The gallbladder is unremarkable.  There is no definite biliary duct dilatation. Adrenal glands: The adrenal glands are morphologically unremarkable without suspicious lesion.  Kidneys, ureter and urinary bladder: Radiopaque contrast is seen within the renal collecting system, ureters, and urinary bladder.  Kidney stones cannot be excluded on this exam.  There is striated right renal nephrogram with right perinephric inflammation.  Spleen: The spleen is normal size.  Pancreas: The pancreas is grossly unremarkable.  GI systems and mesentery: No evidence of bowel obstruction.  The appendix is visualized and unremarkable in appearance.  No significant mesenteric inflammation.  Lymph nodes: No definite pathologically enlarged abdominal or pelvic lymph nodes present within the limits of this noncontrast enhanced exam.  Vessels: The abdominal aorta is normal in caliber.  The inferior vena cava is unremarkable.  Peritoneum: No free intraperitoneal fluid or pneumoperitoneum.  Pelvic viscera: No acute findings.  Body wall: No body wall contusion. Bones: No acute fracture.     Impression: Right pyelonephritis. Authenticated and Electronically Signed by Jacky Loo MD on 07/23/2023 11:16:49 PM    CT Angiogram Chest Pulmonary Embolism    Result Date: 7/23/2023  FINAL REPORT TECHNIQUE: Multiple axial CT images were obtained through the chest following IV contrast using a CTA/PE protocol.  3D/MIP reconstruction images were also performed. This study was performed with techniques to keep radiation doses as low as reasonably achievable (ALARA).  Individualized dose reduction techniques using automated exposure control or adjustment of mA and/or kV according to the patient's size were employed. CLINICAL HISTORY: Pulmonary embolism (PE) suspected, high prob FINDINGS: PAs and aorta: No pulmonary embolus.  Thoracic aorta is unremarkable.  No significant coronary artery disease. Heart/mediastinum: No evidence for right heart strain.  No pericardial effusion.  The heart is normal in size.  Lungs: Mild atelectasis.  Otherwise the lungs are clear.  Lymph nodes: No pathologically enlarged thoracic lymph nodes.  Pleura: No pneumothorax or pleural effusion.  Chest Wall: No chest wall contusion.  Bones: No acute fracture.  Upper abdomen: The right kidney is enlarged and demonstrates areas of diminished renal cortical enhancement with surrounding perinephric inflammation.     Impression: No pulmonary embolus.   Right pyelonephritis. Authenticated and Electronically Signed by Jacky Loo MD on 07/23/2023 10:19:31 PM      Medical Decision Making  38-year-old female patient comes into the ED today complaining of fever, flank pain, confusion body aches    Physical exam: GCS 15 alert and orient x3 responds appropriate questions, cardiac regular rate and rhythm, sinus tachycardia positive pulses bilateral upper and lower extremity abdomen mild tenderness palpation right flank, respiratory decreased in bases bilaterally    DDX: includes but is not limited to: Sepsis, urinary tract infection, pyelonephritis, pneumonia, pulmonary emboli, drug abuse    Amount and/or Complexity of Data Reviewed  Radiology: ordered. Decision-making details documented in ED Course.  Discussion of management or test interpretation with external provider(s): Discussed assessment, treatment and plan with ER attending, hospitalist    Risk  Prescription drug management.  Risk Details: Hospitalist has agreed to admit the patient for pyelonephritis.                  Final diagnoses:   Pyelonephritis           Naseem Rodriguez, APRN  07/23/23 2113

## 2023-07-25 LAB
ANION GAP SERPL CALCULATED.3IONS-SCNC: 12.6 MMOL/L (ref 5–15)
BACTERIA SPEC AEROBE CULT: NORMAL
BASOPHILS # BLD AUTO: 0.02 10*3/MM3 (ref 0–0.2)
BASOPHILS NFR BLD AUTO: 0.3 % (ref 0–1.5)
BUN SERPL-MCNC: 6 MG/DL (ref 6–20)
BUN/CREAT SERPL: 9.4 (ref 7–25)
CALCIUM SPEC-SCNC: 8.6 MG/DL (ref 8.6–10.5)
CHLORIDE SERPL-SCNC: 103 MMOL/L (ref 98–107)
CO2 SERPL-SCNC: 23.4 MMOL/L (ref 22–29)
CREAT SERPL-MCNC: 0.64 MG/DL (ref 0.57–1)
DEPRECATED RDW RBC AUTO: 40.9 FL (ref 37–54)
EGFRCR SERPLBLD CKD-EPI 2021: 115.5 ML/MIN/1.73
EOSINOPHIL # BLD AUTO: 0.08 10*3/MM3 (ref 0–0.4)
EOSINOPHIL NFR BLD AUTO: 1.1 % (ref 0.3–6.2)
ERYTHROCYTE [DISTWIDTH] IN BLOOD BY AUTOMATED COUNT: 12.1 % (ref 12.3–15.4)
GLUCOSE SERPL-MCNC: 87 MG/DL (ref 65–99)
HCT VFR BLD AUTO: 30.5 % (ref 34–46.6)
HGB BLD-MCNC: 10.2 G/DL (ref 12–15.9)
IMM GRANULOCYTES # BLD AUTO: 0.08 10*3/MM3 (ref 0–0.05)
IMM GRANULOCYTES NFR BLD AUTO: 1.1 % (ref 0–0.5)
LYMPHOCYTES # BLD AUTO: 1.76 10*3/MM3 (ref 0.7–3.1)
LYMPHOCYTES NFR BLD AUTO: 25.2 % (ref 19.6–45.3)
MCH RBC QN AUTO: 30.6 PG (ref 26.6–33)
MCHC RBC AUTO-ENTMCNC: 33.4 G/DL (ref 31.5–35.7)
MCV RBC AUTO: 91.6 FL (ref 79–97)
MONOCYTES # BLD AUTO: 0.55 10*3/MM3 (ref 0.1–0.9)
MONOCYTES NFR BLD AUTO: 7.9 % (ref 5–12)
NEUTROPHILS NFR BLD AUTO: 4.5 10*3/MM3 (ref 1.7–7)
NEUTROPHILS NFR BLD AUTO: 64.4 % (ref 42.7–76)
NRBC BLD AUTO-RTO: 0 /100 WBC (ref 0–0.2)
PLATELET # BLD AUTO: 351 10*3/MM3 (ref 140–450)
PMV BLD AUTO: 8.7 FL (ref 6–12)
POTASSIUM SERPL-SCNC: 4.3 MMOL/L (ref 3.5–5.2)
PROCALCITONIN SERPL-MCNC: 1.91 NG/ML (ref 0–0.25)
RBC # BLD AUTO: 3.33 10*6/MM3 (ref 3.77–5.28)
SODIUM SERPL-SCNC: 139 MMOL/L (ref 136–145)
WBC NRBC COR # BLD: 6.99 10*3/MM3 (ref 3.4–10.8)

## 2023-07-25 PROCEDURE — 84145 PROCALCITONIN (PCT): CPT | Performed by: NURSE PRACTITIONER

## 2023-07-25 PROCEDURE — 25010000002 CEFTRIAXONE SODIUM-DEXTROSE 1000-3.74 MG-%(50ML) RECONSTITUTED SOLUTION: Performed by: FAMILY MEDICINE

## 2023-07-25 PROCEDURE — 96372 THER/PROPH/DIAG INJ SC/IM: CPT

## 2023-07-25 PROCEDURE — 25010000002 ENOXAPARIN PER 10 MG: Performed by: FAMILY MEDICINE

## 2023-07-25 PROCEDURE — 85025 COMPLETE CBC W/AUTO DIFF WBC: CPT | Performed by: NURSE PRACTITIONER

## 2023-07-25 PROCEDURE — 80048 BASIC METABOLIC PNL TOTAL CA: CPT | Performed by: NURSE PRACTITIONER

## 2023-07-25 PROCEDURE — G0378 HOSPITAL OBSERVATION PER HR: HCPCS

## 2023-07-25 RX ADMIN — ATENOLOL 25 MG: 25 TABLET ORAL at 09:00

## 2023-07-25 RX ADMIN — SODIUM CHLORIDE 100 ML/HR: 9 INJECTION, SOLUTION INTRAVENOUS at 05:29

## 2023-07-25 RX ADMIN — ENOXAPARIN SODIUM 40 MG: 100 INJECTION SUBCUTANEOUS at 08:59

## 2023-07-25 RX ADMIN — HYDROCODONE BITARTRATE AND ACETAMINOPHEN 1 TABLET: 5; 325 TABLET ORAL at 15:54

## 2023-07-25 RX ADMIN — Medication 10 ML: at 09:00

## 2023-07-25 RX ADMIN — CEFTRIAXONE 1000 MG: 1 INJECTION, SOLUTION INTRAVENOUS at 20:48

## 2023-07-25 RX ADMIN — ACETAMINOPHEN 650 MG: 325 TABLET, FILM COATED ORAL at 13:21

## 2023-07-25 NOTE — PROGRESS NOTES
[facility] HOSPITALIST    PROGRESS NOTE    Name:  Tonya Richardson   Age:  38 y.o.  Sex:  female  :  1985  MRN:  [id number]   Visit Number:  [id number]  Admission Date:  2023  Date Of Service:  23  Primary Care Physician:  Gold Jordan APRN     LOS: 1 day :    Chief Complaint:      Right Flank Pain    Subjective:    Patient seen and examined this morning at bedside.  She is awake and states she is feeling much better today.  Advises she has had pyelonephritis in the past.  Discussed that we were still waiting on blood cultures and urine culture results.  Afebrile.  Labs and vitals are stable.    Hospital Course:    Patient is a 38 years old female with a past medical history of IV illicit drugs abuse and hepatitis C who presented to the ER with a chief complaint of right flank pain.  Patient reports her right flank started hurting around 1 week ago, associated with symptoms of fever, chills, body aches and nausea.  No vomiting or changes in her bowel movements.  Patient reporting also urinary urgency and unable to empty her bladder.  Her last period was 3 weeks ago.  She has a history of recurrent UTIs in the past.  Patient quit smoking over 6 months ago, admitted using nasal methamphetamine 4 or 5 days ago.     On ER evaluation, Patient was tachycardic with a heart rate of 120s, afebrile, hemodynamically stable on the blood pressure.  Her work-up was notable for WBC of 17.0, hemoglobin 10.8.  Urinalysis negative for nitrates but showed leukocytes 3+ with WBC of 6-12 and 2+ bacteria.  UDS positive for meth and cocaine.  Strep, COVID and flu negative.  Lactate and lipase WNL.  CTA chest with no PE.  CT abdomen pelvis showed right pyelonephritis.  Patient received total of 3 L normal saline boluses while in the ER and was started on Rocephin.  Hospitalist consulted for admission and treatment.     Review Of Systems:    Review of Systems:     All systems were reviewed and negative  except as mentioned in subjective, assessment and plan.    Vital Signs:    Temp:  [98.5 °F (36.9 °C)-98.7 °F (37.1 °C)] 98.5 °F (36.9 °C)  Heart Rate:  [] 70  Resp:  [16-18] 18  BP: (110-121)/(71-90) 112/90    Intake and output:    I/O last 3 completed shifts:  In: 2370 [P.O.:1320; IV Piggyback:1050]  Out: 3350 [Urine:3350]  I/O this shift:  In: 240 [P.O.:240]  Out: -     Physical Examination:  Examined again 7/25/2023    General Appearance:  Alert and cooperative, pleasant middle aged female, no acute distress on exam   Head:  Atraumatic and normocephalic.   Eyes: Conjunctivae and sclerae normal, no icterus. No pallor.   Throat: No oral lesions, no thrush, oral mucosa moist.   Neck: Supple, trachea midline   Lungs:   Breath sounds heard bilaterally equally.  No wheezing or crackles. Unlabored on room air   Heart:  Normal S1 and S2, tachycardic, no murmur, no gallop, no rub. No JVD.   Abdomen:   Normal bowel sounds, no masses, no organomegaly. Soft, nontender, nondistended, no rebound tenderness, right CVA tenderness noted   Extremities: Supple, no edema, no cyanosis, no clubbing.   Skin: No bleeding or rash.   Neurologic: Alert and oriented x 3. No facial asymmetry. Moves all four limbs. No tremors.      Laboratory results:    Results from last 7 days   Lab Units 07/25/23  0830 07/24/23  1953 07/24/23  0604 07/23/23  1900   SODIUM mmol/L 139  --  137 136   POTASSIUM mmol/L 4.3 4.1 3.4* 3.5   CHLORIDE mmol/L 103  --  105 100   CO2 mmol/L 23.4  --  21.3* 24.2   BUN mg/dL 6  --  7 9   CREATININE mg/dL 0.64  --  0.60 0.64   CALCIUM mg/dL 8.6  --  7.9* 8.4*   BILIRUBIN mg/dL  --   --   --  0.3   ALK PHOS U/L  --   --   --  237*   ALT (SGPT) U/L  --   --   --  21   AST (SGOT) U/L  --   --   --  15   GLUCOSE mg/dL 87  --  91 96       Results from last 7 days   Lab Units 07/25/23  0915 07/24/23  0604 07/23/23  1900   WBC 10*3/mm3 6.99 12.80* 17.09*   HEMOGLOBIN g/dL 10.2* 9.7* 10.8*   HEMATOCRIT % 30.5* 28.5* 31.2*    PLATELETS 10*3/mm3 351 340 331               Results from last 7 days   Lab Units 07/23/23  1941 07/23/23  1937 07/23/23  1841   BLOODCX  No growth at 24 hours No growth at 24 hours  --    URINECX   --   --  >100,000 CFU/mL Gram Negative Bacilli*     No results for input(s): PHART, IUH9XFY, PO2ART, NCV7NEZ, BASEEXCESS in the last 8760 hours.   I have reviewed the patient's laboratory results.    Radiology results:    CT Abdomen Pelvis Without Contrast    Result Date: 7/23/2023  FINAL REPORT TECHNIQUE: Axial CT images were performed from the lung bases through the symphysis pubis without IV contrast.  This study was performed with techniques to keep radiation doses as low as reasonably achievable (ALARA). Individualized dose reduction techniques using automated exposure control or adjustment of mA and/or kV according to the patient's size were employed. CLINICAL HISTORY: pyelo, r/o stone FINDINGS: Lack of intravenous contrast limits evaluation of solid abdominal and pelvic organs.  ABDOMEN/PELVIS:  Liver, gallbladder and bile ducts: The unenhanced liver is grossly unremarkable without focal abnormality.  The gallbladder is unremarkable.  There is no definite biliary duct dilatation. Adrenal glands: The adrenal glands are morphologically unremarkable without suspicious lesion.  Kidneys, ureter and urinary bladder: Radiopaque contrast is seen within the renal collecting system, ureters, and urinary bladder.  Kidney stones cannot be excluded on this exam.  There is striated right renal nephrogram with right perinephric inflammation.  Spleen: The spleen is normal size.  Pancreas: The pancreas is grossly unremarkable.  GI systems and mesentery: No evidence of bowel obstruction.  The appendix is visualized and unremarkable in appearance.  No significant mesenteric inflammation.  Lymph nodes: No definite pathologically enlarged abdominal or pelvic lymph nodes present within the limits of this noncontrast enhanced exam.   Vessels: The abdominal aorta is normal in caliber.  The inferior vena cava is unremarkable.  Peritoneum: No free intraperitoneal fluid or pneumoperitoneum.  Pelvic viscera: No acute findings.  Body wall: No body wall contusion. Bones: No acute fracture.     Impression: Right pyelonephritis. Authenticated and Electronically Signed by Jacky Loo MD on 07/23/2023 11:16:49 PM    XR Chest 1 View    Result Date: 7/24/2023  CLINICAL INDICATION:  Fever, tachycardia  EXAMINATION TECHNIQUE: XR CHEST 1 VW-  COMPARISON: None.  FINDINGS: Lungs are clear without focal airspace consolidation. No pneumothorax or large pleural effusion. Heart and mediastinal contours are within normal limits. No acute osseous or soft tissue abnormality. No free air under hemidiaphragms.      Impression: No acute cardiopulmonary findings.  Images personally reviewed, interpreted and dictated by LES Jolley.       This report was signed and finalized on 7/24/2023 7:49 AM by LES Jolley.    CT Angiogram Chest Pulmonary Embolism    Result Date: 7/23/2023  FINAL REPORT TECHNIQUE: Multiple axial CT images were obtained through the chest following IV contrast using a CTA/PE protocol.  3D/MIP reconstruction images were also performed. This study was performed with techniques to keep radiation doses as low as reasonably achievable (ALARA). Individualized dose reduction techniques using automated exposure control or adjustment of mA and/or kV according to the patient's size were employed. CLINICAL HISTORY: Pulmonary embolism (PE) suspected, high prob FINDINGS: PAs and aorta: No pulmonary embolus.  Thoracic aorta is unremarkable.  No significant coronary artery disease. Heart/mediastinum: No evidence for right heart strain.  No pericardial effusion.  The heart is normal in size.  Lungs: Mild atelectasis.  Otherwise the lungs are clear.  Lymph nodes: No pathologically enlarged thoracic lymph nodes.  Pleura: No pneumothorax or pleural  effusion.  Chest Wall: No chest wall contusion.  Bones: No acute fracture.  Upper abdomen: The right kidney is enlarged and demonstrates areas of diminished renal cortical enhancement with surrounding perinephric inflammation.     Impression: No pulmonary embolus.   Right pyelonephritis. Authenticated and Electronically Signed by Jacky Loo MD on 07/23/2023 10:19:31 PM   I have reviewed the patient's radiology reports.    Medication Review:     I have reviewed the patient's active and prn medications.     Problem List:      Pyelonephritis      Assessment:    Right pyelonephritis/acute UTI, POA  Sepsis, secondary to #2, POA  Illicit drug abuse  Hepatitis C  Hypertension    Plan:    Right polynephritis/sepsis  -Received IV fluid boluses in the ED, continue with maintenance IV fluids  -Continue patient on Rocephin for now  -Follow blood cultures and urine cultures  -Procal-4 on admission--trending down  -Past urine cultures on file from 2022 showed E. coli and was sensitive to Rocephin.  -Pain control, antiemetics and supportive care.     Illicit drug abuse  -UDS positive for methamphetamine and cocaine.     Hypertension  -Patient reports that she takes atenolol 25 mg twice daily as needed??   -Will continue with atenolol 25 mg daily.    DVT Prophylaxis: Lovenox  Code Status: Full Code  Diet: Cardiac  Discharge Plan: 1-2 days    Mariya Hemphill, APRN  07/25/23  11:20 EDT    Dictated utilizing Dragon dictation.

## 2023-07-25 NOTE — PLAN OF CARE
Goal Outcome Evaluation:  Plan of Care Reviewed With: patient        Progress: improving     Pt VSS. Pt denied SOB or any chest pain. Patient complained of a lingering headache. PRN pain meds were given as prescribed. No acute events occurred during shift.

## 2023-07-25 NOTE — PLAN OF CARE
Goal Outcome Evaluation:  Plan of Care Reviewed With: patient        Progress: no change  Outcome Evaluation: PT LYING IN BED RESTING COMFORTABLY THIS SHIFT. NO ACUTE EVENTS OVERNIGHT. VSS. WILL CONTINUE TO MONITOR CLOSELY.

## 2023-07-26 VITALS
BODY MASS INDEX: 21.27 KG/M2 | TEMPERATURE: 97.3 F | HEIGHT: 65 IN | DIASTOLIC BLOOD PRESSURE: 87 MMHG | SYSTOLIC BLOOD PRESSURE: 122 MMHG | HEART RATE: 103 BPM | WEIGHT: 127.65 LBS | RESPIRATION RATE: 16 BRPM | OXYGEN SATURATION: 98 %

## 2023-07-26 PROBLEM — I10 ESSENTIAL HYPERTENSION: Status: ACTIVE | Noted: 2023-07-26

## 2023-07-26 PROBLEM — F19.10 DRUG ABUSE: Status: ACTIVE | Noted: 2023-07-26

## 2023-07-26 LAB
ANION GAP SERPL CALCULATED.3IONS-SCNC: 14.1 MMOL/L (ref 5–15)
BACTERIA SPEC AEROBE CULT: ABNORMAL
BUN SERPL-MCNC: 11 MG/DL (ref 6–20)
BUN/CREAT SERPL: 16.2 (ref 7–25)
CALCIUM SPEC-SCNC: 9.1 MG/DL (ref 8.6–10.5)
CHLORIDE SERPL-SCNC: 103 MMOL/L (ref 98–107)
CO2 SERPL-SCNC: 20.9 MMOL/L (ref 22–29)
CREAT SERPL-MCNC: 0.68 MG/DL (ref 0.57–1)
DEPRECATED RDW RBC AUTO: 38.9 FL (ref 37–54)
EGFRCR SERPLBLD CKD-EPI 2021: 113.8 ML/MIN/1.73
ERYTHROCYTE [DISTWIDTH] IN BLOOD BY AUTOMATED COUNT: 11.9 % (ref 12.3–15.4)
GLUCOSE SERPL-MCNC: 93 MG/DL (ref 65–99)
HCT VFR BLD AUTO: 36.4 % (ref 34–46.6)
HGB BLD-MCNC: 12.4 G/DL (ref 12–15.9)
MCH RBC QN AUTO: 30.2 PG (ref 26.6–33)
MCHC RBC AUTO-ENTMCNC: 34.1 G/DL (ref 31.5–35.7)
MCV RBC AUTO: 88.8 FL (ref 79–97)
PLATELET # BLD AUTO: 350 10*3/MM3 (ref 140–450)
PMV BLD AUTO: 9.7 FL (ref 6–12)
POTASSIUM SERPL-SCNC: 4.4 MMOL/L (ref 3.5–5.2)
RBC # BLD AUTO: 4.1 10*6/MM3 (ref 3.77–5.28)
SODIUM SERPL-SCNC: 138 MMOL/L (ref 136–145)
WBC NRBC COR # BLD: 7.42 10*3/MM3 (ref 3.4–10.8)

## 2023-07-26 PROCEDURE — 96372 THER/PROPH/DIAG INJ SC/IM: CPT

## 2023-07-26 PROCEDURE — G0378 HOSPITAL OBSERVATION PER HR: HCPCS

## 2023-07-26 PROCEDURE — 80048 BASIC METABOLIC PNL TOTAL CA: CPT | Performed by: NURSE PRACTITIONER

## 2023-07-26 PROCEDURE — 85027 COMPLETE CBC AUTOMATED: CPT | Performed by: NURSE PRACTITIONER

## 2023-07-26 PROCEDURE — 25010000002 ENOXAPARIN PER 10 MG: Performed by: FAMILY MEDICINE

## 2023-07-26 RX ORDER — CEFDINIR 300 MG/1
300 CAPSULE ORAL 2 TIMES DAILY
Qty: 14 CAPSULE | Refills: 0 | Status: SHIPPED | OUTPATIENT
Start: 2023-07-26 | End: 2023-08-02

## 2023-07-26 RX ADMIN — HYDROCODONE BITARTRATE AND ACETAMINOPHEN 1 TABLET: 5; 325 TABLET ORAL at 04:37

## 2023-07-26 RX ADMIN — ATENOLOL 25 MG: 25 TABLET ORAL at 09:34

## 2023-07-26 RX ADMIN — ENOXAPARIN SODIUM 40 MG: 100 INJECTION SUBCUTANEOUS at 09:34

## 2023-07-26 RX ADMIN — Medication 10 ML: at 09:34

## 2023-07-26 RX ADMIN — SODIUM CHLORIDE 100 ML/HR: 9 INJECTION, SOLUTION INTRAVENOUS at 04:30

## 2023-07-26 NOTE — DISCHARGE INSTRUCTIONS
You are being discharged home today    Complete all of the antibiotic prescribed (Cefdinir)     Follow up with PCP next week for a recheck    Seek help to stop using drugs

## 2023-07-26 NOTE — CASE MANAGEMENT/SOCIAL WORK
Case Management/Social Work    Patient Name:  Tonya Richardson  YOB: 1985  MRN: [id number]  Admit Date:  7/23/2023        Pt. will be returning home alone at discharge. Pt. Denied any current discharge needs. SW/CM will continue to follow in the event discharge needs arise.       Electronically signed by:  Rachid Gregorio  07/26/23 09:28 EDT

## 2023-07-26 NOTE — CONSULTS
This clinician was notified of behavioral health consult. Clinician was then later notified that patient discharged. Therefore, assessment was unable to be completed.     -Abilio Reddy LCSW.   7/26/2023.

## 2023-07-26 NOTE — DISCHARGE SUMMARY
[facility]   DISCHARGE SUMMARY      Name:  Tonya Richardson   Age:  38 y.o.  Sex:  female  :  1985  MRN:  [id number]   Visit Number:  [id number]    Admission Date:  2023  Date of Discharge:  2023  Primary Care Physician:  Gold Jordan APRN    Important issues to note:    Admitted to the hospital for pyelonephritis and UTI.  Started on IV fluids and Rocephin.  Improved with reassuring labs.  Blood cultures without growth.  Urine culture growing E.Coli susceptible to Ceftriaxone.    Stable for discharge home today.  Transition to Cefdinir at discharge for total of 10 days of treatment.  Behavioral Health consulted on day of discharge for drug abuse.  Follow up with PCP next week.  Return to the hospital as needed.    Discharge Diagnoses:     Right pyelonephritis/acute UTI, POA, E.Coli  Sepsis, secondary to #2, POA  Illicit drug abuse  Hepatitis C  Hypertension    Problem List:     Active Hospital Problems    Diagnosis  POA    **Pyelonephritis [N12]  Yes    Essential hypertension [I10]  Yes    Drug abuse [F19.10]  Yes      Resolved Hospital Problems   No resolved problems to display.     Presenting Problem:    Chief Complaint   Patient presents with    Fever      Consults:     Consulting Physician(s)               None              History of presenting illness/Hospital Course:    Patient is a 38 years old female with a past medical history of IV illicit drugs abuse and hepatitis C who presented to the ER 2023 with a chief complaint of right flank pain.  Patient reports her right flank started hurting around 1 week ago, associated with symptoms of fever, chills, body aches and nausea.  No vomiting or changes in her bowel movements.  Patient reporting also urinary urgency and unable to empty her bladder.  Her last period was 3 weeks ago.  She has a history of recurrent UTIs in the past.  Patient quit smoking over 6 months ago, admitted using nasal methamphetamine 4 or 5  days ago.     On ER evaluation, Patient was tachycardic with a heart rate of 120s, afebrile, hemodynamically stable on the blood pressure.  Her work-up was notable for WBC of 17.0, hemoglobin 10.8.  Urinalysis negative for nitrates but showed leukocytes 3+ with WBC of 6-12 and 2+ bacteria.  UDS positive for meth and cocaine.  Strep, COVID and flu negative.  Lactate and lipase WNL.  CTA chest with no PE.  CT abdomen pelvis showed right pyelonephritis.  Patient received total of 3 L normal saline boluses while in the ER and was started on Rocephin.  Hospitalist consulted for admission and treatment.    Admitted to the hospital and continued on IV fluid hydration and IV Rocephin.  Blood cultures remained without growth.  Urine culture noted to be growing E.Coli susceptible to Ceftriaxone.  Patient has remained afebrile with clinical improvement.  Labs have been reassuring.  Patient agreed to behavioral health consult prior to discharge for any outpatient assistance that might be available to help her stop using illicit drugs.      Stable for discharge home today.  Will transition to Cefdinir at discharge for total of 10 days of treatment for pyelonephritis.  Patient to follow up with PCP next week for recheck.  Discussed abstaining for drug use extensively.  Return to the hospital as needed.    Vital Signs:    Temp:  [97.6 °F (36.4 °C)-98.5 °F (36.9 °C)] 98.1 °F (36.7 °C)  Heart Rate:  [] 103  Resp:  [16-18] 16  BP: (106-140)/() 133/96    Physical Exam:    General Appearance:  Alert and cooperative, pleasant middle aged female, no acute distress on exam   Head:  Atraumatic and normocephalic.   Eyes: Conjunctivae and sclerae normal, no icterus. No pallor.   Ears:  Ears with no abnormalities noted.   Throat: No oral lesions, no thrush, oral mucosa moist.   Neck: Supple, trachea midline   Back:   No kyphoscoliosis present. No tenderness to palpation.   Lungs:   Breath sounds heard bilaterally equally.  No  crackles or wheezing. Unlabored on room air   Heart:  Normal S1 and S2, no murmur, no gallop, no rub. No JVD.   Abdomen:   Normal bowel sounds, no masses, no organomegaly. Soft, nontender, nondistended, no rebound tenderness.   Extremities: Supple, no edema, no cyanosis, no clubbing.   Pulses: Pulses palpable bilaterally.   Skin: No bleeding or rash.   Neurologic: Alert and oriented x 3. No facial asymmetry. Moves all four limbs. No tremors.     Pertinent Lab Results:     Results from last 7 days   Lab Units 07/26/23  0613 07/25/23  0830 07/24/23  1953 07/24/23  0604 07/23/23  1900   SODIUM mmol/L 138 139  --  137 136   POTASSIUM mmol/L 4.4 4.3 4.1 3.4* 3.5   CHLORIDE mmol/L 103 103  --  105 100   CO2 mmol/L 20.9* 23.4  --  21.3* 24.2   BUN mg/dL 11 6  --  7 9   CREATININE mg/dL 0.68 0.64  --  0.60 0.64   CALCIUM mg/dL 9.1 8.6  --  7.9* 8.4*   BILIRUBIN mg/dL  --   --   --   --  0.3   ALK PHOS U/L  --   --   --   --  237*   ALT (SGPT) U/L  --   --   --   --  21   AST (SGOT) U/L  --   --   --   --  15   GLUCOSE mg/dL 93 87  --  91 96     Results from last 7 days   Lab Units 07/26/23  0613 07/25/23  0915 07/24/23  0604   WBC 10*3/mm3 7.42 6.99 12.80*   HEMOGLOBIN g/dL 12.4 10.2* 9.7*   HEMATOCRIT % 36.4 30.5* 28.5*   PLATELETS 10*3/mm3 350 351 340                     Results from last 7 days   Lab Units 07/23/23  1900   LIPASE U/L 12*         Results from last 7 days   Lab Units 07/23/23  1941 07/23/23  1937 07/23/23  1841   BLOODCX  No growth at 2 days No growth at 2 days  --    URINECX   --   --  >100,000 CFU/mL Escherichia coli*       Pertinent Radiology Results:    Imaging Results (All)       Procedure Component Value Units Date/Time    XR Chest 1 View [[id number]] Collected: 07/24/23 0749     Updated: 07/24/23 0752    Narrative:      CLINICAL INDICATION:    Fever, tachycardia     EXAMINATION TECHNIQUE:   XR CHEST 1 VW-     COMPARISON:  None.     FINDINGS:  Lungs are clear without focal airspace consolidation. No  pneumothorax or  large pleural effusion. Heart and mediastinal contours are within normal  limits. No acute osseous or soft tissue abnormality. No free air under  hemidiaphragms.       Impression:      No acute cardiopulmonary findings.     Images personally reviewed, interpreted and dictated by LES Jolley.                This report was signed and finalized on 7/24/2023 7:49 AM by LES Jolley.    CT Abdomen Pelvis Without Contrast [[id number]] Collected: 07/23/23 2316     Updated: 07/23/23 2317    Narrative:      FINAL REPORT    TECHNIQUE:  Axial CT images were performed from the lung bases through the  symphysis pubis without IV contrast.  This study was performed  with techniques to keep radiation doses as low as reasonably  achievable (ALARA). Individualized dose reduction techniques  using automated exposure control or adjustment of mA and/or kV  according to the patient's size were employed.    CLINICAL HISTORY:  pyelo, r/o stone    FINDINGS:  Lack of intravenous contrast limits evaluation of solid  abdominal and pelvic organs.  ABDOMEN/PELVIS:  Liver,  gallbladder and bile ducts: The unenhanced liver is grossly  unremarkable without focal abnormality.  The gallbladder is  unremarkable.  There is no definite biliary duct dilatation.  Adrenal glands: The adrenal glands are morphologically  unremarkable without suspicious lesion.  Kidneys, ureter and  urinary bladder: Radiopaque contrast is seen within the renal  collecting system, ureters, and urinary bladder.  Kidney stones  cannot be excluded on this exam.  There is striated right renal  nephrogram with right perinephric inflammation.  Spleen: The  spleen is normal size.  Pancreas: The pancreas is grossly  unremarkable.  GI systems and mesentery: No evidence of bowel  obstruction.  The appendix is visualized and unremarkable in  appearance.  No significant mesenteric inflammation.  Lymph  nodes: No definite pathologically enlarged  abdominal or pelvic  lymph nodes present within the limits of this noncontrast  enhanced exam.  Vessels: The abdominal aorta is normal in  caliber.  The inferior vena cava is unremarkable.  Peritoneum:  No free intraperitoneal fluid or pneumoperitoneum.  Pelvic  viscera: No acute findings.  Body wall: No body wall contusion.  Bones: No acute fracture.      Impression:      Right pyelonephritis.    Authenticated and Electronically Signed by Jacky Loo MD on  07/23/2023 11:16:49 PM    CT Angiogram Chest Pulmonary Embolism [[id number]] Collected: 07/23/23 2219     Updated: 07/23/23 2220    Narrative:      FINAL REPORT    TECHNIQUE:  Multiple axial CT images were obtained through the chest  following IV contrast using a CTA/PE protocol.  3D/MIP  reconstruction images were also performed. This study was  performed with techniques to keep radiation doses as low as  reasonably achievable (ALARA). Individualized dose reduction  techniques using automated exposure control or adjustment of mA  and/or kV according to the patient's size were employed.    CLINICAL HISTORY:  Pulmonary embolism (PE) suspected, high prob    FINDINGS:  PAs and aorta: No pulmonary embolus.  Thoracic aorta is  unremarkable.  No significant coronary artery disease.  Heart/mediastinum: No evidence for right heart strain.  No  pericardial effusion.  The heart is normal in size.  Lungs: Mild  atelectasis.  Otherwise the lungs are clear.  Lymph nodes: No  pathologically enlarged thoracic lymph nodes.  Pleura: No  pneumothorax or pleural effusion.  Chest Wall: No chest wall  contusion.  Bones: No acute fracture.  Upper abdomen: The right  kidney is enlarged and demonstrates areas of diminished renal  cortical enhancement with surrounding perinephric inflammation.      Impression:      No pulmonary embolus.   Right pyelonephritis.    Authenticated and Electronically Signed by Jacky Loo MD on  07/23/2023 10:19:31 PM            Condition on Discharge:       Stable.    Code status during the hospital stay:    Code Status and Medical Interventions:   Ordered at: 07/24/23 0045     Code Status (Patient has no pulse and is not breathing):    CPR (Attempt to Resuscitate)     Medical Interventions (Patient has pulse or is breathing):    Full Support     Release to patient:    Routine Release     Discharge Disposition:        Discharge Medications:       Discharge Medications        New Medications        Instructions Start Date   cefdinir 300 MG capsule  Commonly known as: OMNICEF   300 mg, Oral, 2 Times Daily             Continue These Medications        Instructions Start Date   atenolol 25 MG tablet  Commonly known as: TENORMIN   25 mg, Oral, 2 Times Daily PRN             Discharge Diet:     Diet Instructions       Diet: Cardiac Diets; Healthy Heart (2-3 Na+); Regular Texture (IDDSI 7); Thin (IDDSI 0)      Discharge Diet: Cardiac Diets    Cardiac Diet: Healthy Heart (2-3 Na+)    Texture: Regular Texture (IDDSI 7)    Fluid Consistency: Thin (IDDSI 0)          Activity at Discharge:     Activity Instructions       Activity as Tolerated            Follow-up Appointments:      No future appointments.  Test Results Pending at Discharge:    Pending Labs       Order Current Status    Blood Culture - Blood, Arm, Left Preliminary result    Blood Culture - Blood, Hand, Right Preliminary result               Mariya Hemphill, APRN  07/26/23  10:40 EDT    Time: I spent 25 minutes on this discharge activity which included: face-to-face encounter with the patient, reviewing the data in the system, coordination of the care with the nursing staff as well as consultants, documentation, and entering orders.     Dictated utilizing Dragon dictation.

## 2023-07-26 NOTE — CASE MANAGEMENT/SOCIAL WORK
Case Management Discharge Note      Final Note: Pt. will be discharging home alone. Pt. will have family/friend provide discharge transport.    Provided Post Acute Provider List?: N/A  Provided Post Acute Provider Quality & Resource List?: N/A    Selected Continued Care - Admitted Since 7/23/2023       Destination    No services have been selected for the patient.                Durable Medical Equipment    No services have been selected for the patient.                Dialysis/Infusion    No services have been selected for the patient.                Home Medical Care    No services have been selected for the patient.                Therapy    No services have been selected for the patient.                Community Resources    No services have been selected for the patient.                Community & DME    No services have been selected for the patient.                    Transportation Services  Private: Car    Final Discharge Disposition Code: 01 - home or self-care

## 2023-07-28 LAB
BACTERIA SPEC AEROBE CULT: NORMAL
BACTERIA SPEC AEROBE CULT: NORMAL

## 2024-07-24 ENCOUNTER — APPOINTMENT (OUTPATIENT)
Dept: CT IMAGING | Facility: HOSPITAL | Age: 39
End: 2024-07-24
Payer: COMMERCIAL

## 2024-07-24 ENCOUNTER — HOSPITAL ENCOUNTER (INPATIENT)
Facility: HOSPITAL | Age: 39
LOS: 1 days | Discharge: HOME | End: 2024-07-26
Attending: EMERGENCY MEDICINE | Admitting: FAMILY MEDICINE
Payer: COMMERCIAL

## 2024-07-24 DIAGNOSIS — A41.9 SEPSIS, DUE TO UNSPECIFIED ORGANISM, UNSPECIFIED WHETHER ACUTE ORGAN DYSFUNCTION PRESENT: Primary | ICD-10-CM

## 2024-07-24 DIAGNOSIS — N12 PYELONEPHRITIS: ICD-10-CM

## 2024-07-24 DIAGNOSIS — D72.825 BANDEMIA: ICD-10-CM

## 2024-07-24 LAB
ALBUMIN SERPL-MCNC: 3.6 G/DL (ref 3.5–5.2)
ALBUMIN/GLOB SERPL: 1 G/DL
ALP SERPL-CCNC: 146 U/L (ref 39–117)
ALT SERPL W P-5'-P-CCNC: 16 U/L (ref 1–33)
ANION GAP SERPL CALCULATED.3IONS-SCNC: 10.9 MMOL/L (ref 5–15)
AST SERPL-CCNC: 21 U/L (ref 1–32)
B-HCG UR QL: NEGATIVE
BACTERIA UR QL AUTO: ABNORMAL /HPF
BASOPHILS # BLD AUTO: 0.06 10*3/MM3 (ref 0–0.2)
BASOPHILS NFR BLD AUTO: 0.2 % (ref 0–1.5)
BILIRUB SERPL-MCNC: 0.8 MG/DL (ref 0–1.2)
BILIRUB UR QL STRIP: ABNORMAL
BUN SERPL-MCNC: 13 MG/DL (ref 6–20)
BUN/CREAT SERPL: 13 (ref 7–25)
CALCIUM SPEC-SCNC: 9 MG/DL (ref 8.6–10.5)
CHLORIDE SERPL-SCNC: 95 MMOL/L (ref 98–107)
CLARITY UR: ABNORMAL
CO2 SERPL-SCNC: 25.1 MMOL/L (ref 22–29)
COLOR UR: ABNORMAL
CREAT SERPL-MCNC: 1 MG/DL (ref 0.57–1)
DEPRECATED RDW RBC AUTO: 41.3 FL (ref 37–54)
EGFRCR SERPLBLD CKD-EPI 2021: 73.2 ML/MIN/1.73
EOSINOPHIL # BLD AUTO: 0.02 10*3/MM3 (ref 0–0.4)
EOSINOPHIL NFR BLD AUTO: 0.1 % (ref 0.3–6.2)
ERYTHROCYTE [DISTWIDTH] IN BLOOD BY AUTOMATED COUNT: 12.6 % (ref 12.3–15.4)
GLOBULIN UR ELPH-MCNC: 3.5 GM/DL
GLUCOSE SERPL-MCNC: 117 MG/DL (ref 65–99)
GLUCOSE UR STRIP-MCNC: NEGATIVE MG/DL
HCT VFR BLD AUTO: 33.3 % (ref 34–46.6)
HGB BLD-MCNC: 11.4 G/DL (ref 12–15.9)
HGB UR QL STRIP.AUTO: ABNORMAL
HOLD SPECIMEN: NORMAL
HOLD SPECIMEN: NORMAL
HYALINE CASTS UR QL AUTO: ABNORMAL /LPF
IMM GRANULOCYTES # BLD AUTO: 0.47 10*3/MM3 (ref 0–0.05)
IMM GRANULOCYTES NFR BLD AUTO: 1.6 % (ref 0–0.5)
KETONES UR QL STRIP: NEGATIVE
LEUKOCYTE ESTERASE UR QL STRIP.AUTO: ABNORMAL
LIPASE SERPL-CCNC: 10 U/L (ref 13–60)
LYMPHOCYTES # BLD AUTO: 0.88 10*3/MM3 (ref 0.7–3.1)
LYMPHOCYTES NFR BLD AUTO: 3 % (ref 19.6–45.3)
MCH RBC QN AUTO: 30.8 PG (ref 26.6–33)
MCHC RBC AUTO-ENTMCNC: 34.2 G/DL (ref 31.5–35.7)
MCV RBC AUTO: 90 FL (ref 79–97)
MONOCYTES # BLD AUTO: 1.86 10*3/MM3 (ref 0.1–0.9)
MONOCYTES NFR BLD AUTO: 6.2 % (ref 5–12)
NEUTROPHILS NFR BLD AUTO: 26.48 10*3/MM3 (ref 1.7–7)
NEUTROPHILS NFR BLD AUTO: 88.9 % (ref 42.7–76)
NITRITE UR QL STRIP: POSITIVE
NRBC BLD AUTO-RTO: 0 /100 WBC (ref 0–0.2)
PH UR STRIP.AUTO: 6 [PH] (ref 5–8)
PLATELET # BLD AUTO: 366 10*3/MM3 (ref 140–450)
PMV BLD AUTO: 9.4 FL (ref 6–12)
POTASSIUM SERPL-SCNC: 4 MMOL/L (ref 3.5–5.2)
PROT SERPL-MCNC: 7.1 G/DL (ref 6–8.5)
PROT UR QL STRIP: ABNORMAL
RBC # BLD AUTO: 3.7 10*6/MM3 (ref 3.77–5.28)
RBC # UR STRIP: ABNORMAL /HPF
REF LAB TEST METHOD: ABNORMAL
SODIUM SERPL-SCNC: 131 MMOL/L (ref 136–145)
SP GR UR STRIP: 1.02 (ref 1–1.03)
SQUAMOUS #/AREA URNS HPF: ABNORMAL /HPF
UROBILINOGEN UR QL STRIP: ABNORMAL
WBC # UR STRIP: ABNORMAL /HPF
WBC NRBC COR # BLD AUTO: 29.77 10*3/MM3 (ref 3.4–10.8)
WHOLE BLOOD HOLD COAG: NORMAL
WHOLE BLOOD HOLD SPECIMEN: NORMAL

## 2024-07-24 PROCEDURE — 80053 COMPREHEN METABOLIC PANEL: CPT

## 2024-07-24 PROCEDURE — 25010000002 KETOROLAC TROMETHAMINE PER 15 MG: Performed by: EMERGENCY MEDICINE

## 2024-07-24 PROCEDURE — 36415 COLL VENOUS BLD VENIPUNCTURE: CPT

## 2024-07-24 PROCEDURE — 74177 CT ABD & PELVIS W/CONTRAST: CPT

## 2024-07-24 PROCEDURE — 81001 URINALYSIS AUTO W/SCOPE: CPT

## 2024-07-24 PROCEDURE — 25810000003 SODIUM CHLORIDE 0.9 % SOLUTION: Performed by: EMERGENCY MEDICINE

## 2024-07-24 PROCEDURE — 80307 DRUG TEST PRSMV CHEM ANLYZR: CPT | Performed by: FAMILY MEDICINE

## 2024-07-24 PROCEDURE — 99291 CRITICAL CARE FIRST HOUR: CPT

## 2024-07-24 PROCEDURE — 81025 URINE PREGNANCY TEST: CPT | Performed by: EMERGENCY MEDICINE

## 2024-07-24 PROCEDURE — 85025 COMPLETE CBC W/AUTO DIFF WBC: CPT

## 2024-07-24 PROCEDURE — 87086 URINE CULTURE/COLONY COUNT: CPT | Performed by: EMERGENCY MEDICINE

## 2024-07-24 PROCEDURE — 87186 SC STD MICRODIL/AGAR DIL: CPT | Performed by: EMERGENCY MEDICINE

## 2024-07-24 PROCEDURE — 96375 TX/PRO/DX INJ NEW DRUG ADDON: CPT

## 2024-07-24 PROCEDURE — 83690 ASSAY OF LIPASE: CPT

## 2024-07-24 PROCEDURE — 87088 URINE BACTERIA CULTURE: CPT | Performed by: EMERGENCY MEDICINE

## 2024-07-24 RX ORDER — ACETAMINOPHEN 325 MG/1
975 TABLET ORAL ONCE
Status: COMPLETED | OUTPATIENT
Start: 2024-07-24 | End: 2024-07-24

## 2024-07-24 RX ORDER — KETOROLAC TROMETHAMINE 30 MG/ML
15 INJECTION, SOLUTION INTRAMUSCULAR; INTRAVENOUS ONCE
Status: COMPLETED | OUTPATIENT
Start: 2024-07-24 | End: 2024-07-24

## 2024-07-24 RX ORDER — SODIUM CHLORIDE 0.9 % (FLUSH) 0.9 %
10 SYRINGE (ML) INJECTION AS NEEDED
Status: DISCONTINUED | OUTPATIENT
Start: 2024-07-24 | End: 2024-07-26 | Stop reason: HOSPADM

## 2024-07-24 RX ADMIN — ACETAMINOPHEN 975 MG: 325 TABLET, FILM COATED ORAL at 23:18

## 2024-07-24 RX ADMIN — SODIUM CHLORIDE 1000 ML: 9 INJECTION, SOLUTION INTRAVENOUS at 23:18

## 2024-07-24 RX ADMIN — KETOROLAC TROMETHAMINE 15 MG: 30 INJECTION, SOLUTION INTRAMUSCULAR; INTRAVENOUS at 23:18

## 2024-07-24 NOTE — Clinical Note
Level of Care: Telemetry [5]   Diagnosis: UTI (urinary tract infection) [937022]   Certification: I Certify That Inpatient Hospital Services Are Medically Necessary For Greater Than 2 Midnights

## 2024-07-25 PROBLEM — N39.0 UTI (URINARY TRACT INFECTION): Status: ACTIVE | Noted: 2024-07-25

## 2024-07-25 LAB
AMPHET+METHAMPHET UR QL: POSITIVE
AMPHETAMINES UR QL: POSITIVE
ANION GAP SERPL CALCULATED.3IONS-SCNC: 12.1 MMOL/L (ref 5–15)
BARBITURATES UR QL SCN: NEGATIVE
BENZODIAZ UR QL SCN: NEGATIVE
BUN SERPL-MCNC: 16 MG/DL (ref 6–20)
BUN/CREAT SERPL: 18.4 (ref 7–25)
BUPRENORPHINE SERPL-MCNC: NEGATIVE NG/ML
CALCIUM SPEC-SCNC: 7.8 MG/DL (ref 8.6–10.5)
CANNABINOIDS SERPL QL: NEGATIVE
CHLORIDE SERPL-SCNC: 102 MMOL/L (ref 98–107)
CO2 SERPL-SCNC: 22.9 MMOL/L (ref 22–29)
COCAINE UR QL: NEGATIVE
CREAT SERPL-MCNC: 0.87 MG/DL (ref 0.57–1)
D-LACTATE SERPL-SCNC: 1.7 MMOL/L (ref 0.5–2)
DEPRECATED RDW RBC AUTO: 41.5 FL (ref 37–54)
EGFRCR SERPLBLD CKD-EPI 2021: 86.5 ML/MIN/1.73
ERYTHROCYTE [DISTWIDTH] IN BLOOD BY AUTOMATED COUNT: 12.8 % (ref 12.3–15.4)
FENTANYL UR-MCNC: POSITIVE NG/ML
GLUCOSE SERPL-MCNC: 137 MG/DL (ref 65–99)
HCT VFR BLD AUTO: 31.7 % (ref 34–46.6)
HGB BLD-MCNC: 10.6 G/DL (ref 12–15.9)
MCH RBC QN AUTO: 30.4 PG (ref 26.6–33)
MCHC RBC AUTO-ENTMCNC: 33.4 G/DL (ref 31.5–35.7)
MCV RBC AUTO: 90.8 FL (ref 79–97)
METHADONE UR QL SCN: NEGATIVE
OPIATES UR QL: NEGATIVE
OXYCODONE UR QL SCN: NEGATIVE
PCP UR QL SCN: NEGATIVE
PLATELET # BLD AUTO: 261 10*3/MM3 (ref 140–450)
PMV BLD AUTO: 9.7 FL (ref 6–12)
POTASSIUM SERPL-SCNC: 3.5 MMOL/L (ref 3.5–5.2)
POTASSIUM SERPL-SCNC: 3.7 MMOL/L (ref 3.5–5.2)
RBC # BLD AUTO: 3.49 10*6/MM3 (ref 3.77–5.28)
SODIUM SERPL-SCNC: 137 MMOL/L (ref 136–145)
TRICYCLICS UR QL SCN: NEGATIVE
WBC NRBC COR # BLD AUTO: 15.92 10*3/MM3 (ref 3.4–10.8)

## 2024-07-25 PROCEDURE — 83605 ASSAY OF LACTIC ACID: CPT | Performed by: EMERGENCY MEDICINE

## 2024-07-25 PROCEDURE — 25510000001 IOPAMIDOL 61 % SOLUTION: Performed by: EMERGENCY MEDICINE

## 2024-07-25 PROCEDURE — 87040 BLOOD CULTURE FOR BACTERIA: CPT | Performed by: EMERGENCY MEDICINE

## 2024-07-25 PROCEDURE — 80048 BASIC METABOLIC PNL TOTAL CA: CPT | Performed by: FAMILY MEDICINE

## 2024-07-25 PROCEDURE — 85027 COMPLETE CBC AUTOMATED: CPT | Performed by: FAMILY MEDICINE

## 2024-07-25 PROCEDURE — 96361 HYDRATE IV INFUSION ADD-ON: CPT

## 2024-07-25 PROCEDURE — 96365 THER/PROPH/DIAG IV INF INIT: CPT

## 2024-07-25 PROCEDURE — 25810000003 SODIUM CHLORIDE 0.9 % SOLUTION: Performed by: INTERNAL MEDICINE

## 2024-07-25 PROCEDURE — 96372 THER/PROPH/DIAG INJ SC/IM: CPT

## 2024-07-25 PROCEDURE — 25810000003 SODIUM CHLORIDE 0.9 % SOLUTION: Performed by: EMERGENCY MEDICINE

## 2024-07-25 PROCEDURE — 99223 1ST HOSP IP/OBS HIGH 75: CPT | Performed by: FAMILY MEDICINE

## 2024-07-25 PROCEDURE — 25010000002 CEFTRIAXONE PER 250 MG: Performed by: EMERGENCY MEDICINE

## 2024-07-25 PROCEDURE — 93005 ELECTROCARDIOGRAM TRACING: CPT | Performed by: EMERGENCY MEDICINE

## 2024-07-25 PROCEDURE — 84132 ASSAY OF SERUM POTASSIUM: CPT | Performed by: INTERNAL MEDICINE

## 2024-07-25 PROCEDURE — 25810000003 SODIUM CHLORIDE 0.9 % SOLUTION: Performed by: FAMILY MEDICINE

## 2024-07-25 PROCEDURE — 25010000002 ENOXAPARIN PER 10 MG: Performed by: FAMILY MEDICINE

## 2024-07-25 RX ORDER — SODIUM CHLORIDE 9 MG/ML
100 INJECTION, SOLUTION INTRAVENOUS CONTINUOUS
Status: DISCONTINUED | OUTPATIENT
Start: 2024-07-25 | End: 2024-07-26 | Stop reason: HOSPADM

## 2024-07-25 RX ORDER — ACETAMINOPHEN 160 MG/5ML
650 SOLUTION ORAL EVERY 4 HOURS PRN
Status: DISCONTINUED | OUTPATIENT
Start: 2024-07-25 | End: 2024-07-26 | Stop reason: HOSPADM

## 2024-07-25 RX ORDER — ONDANSETRON 2 MG/ML
4 INJECTION INTRAMUSCULAR; INTRAVENOUS EVERY 6 HOURS PRN
Status: DISCONTINUED | OUTPATIENT
Start: 2024-07-25 | End: 2024-07-26 | Stop reason: HOSPADM

## 2024-07-25 RX ORDER — SODIUM CHLORIDE 9 MG/ML
40 INJECTION, SOLUTION INTRAVENOUS AS NEEDED
Status: DISCONTINUED | OUTPATIENT
Start: 2024-07-25 | End: 2024-07-26 | Stop reason: HOSPADM

## 2024-07-25 RX ORDER — SODIUM CHLORIDE 0.9 % (FLUSH) 0.9 %
10 SYRINGE (ML) INJECTION AS NEEDED
Status: DISCONTINUED | OUTPATIENT
Start: 2024-07-25 | End: 2024-07-26 | Stop reason: HOSPADM

## 2024-07-25 RX ORDER — BISACODYL 10 MG
10 SUPPOSITORY, RECTAL RECTAL DAILY PRN
Status: DISCONTINUED | OUTPATIENT
Start: 2024-07-25 | End: 2024-07-26 | Stop reason: HOSPADM

## 2024-07-25 RX ORDER — AMOXICILLIN 250 MG
2 CAPSULE ORAL 2 TIMES DAILY PRN
Status: DISCONTINUED | OUTPATIENT
Start: 2024-07-25 | End: 2024-07-26 | Stop reason: HOSPADM

## 2024-07-25 RX ORDER — SODIUM CHLORIDE 0.9 % (FLUSH) 0.9 %
10 SYRINGE (ML) INJECTION EVERY 12 HOURS SCHEDULED
Status: DISCONTINUED | OUTPATIENT
Start: 2024-07-25 | End: 2024-07-26 | Stop reason: HOSPADM

## 2024-07-25 RX ORDER — ACETAMINOPHEN 325 MG/1
650 TABLET ORAL EVERY 4 HOURS PRN
Status: DISCONTINUED | OUTPATIENT
Start: 2024-07-25 | End: 2024-07-26 | Stop reason: HOSPADM

## 2024-07-25 RX ORDER — ACETAMINOPHEN 650 MG/1
650 SUPPOSITORY RECTAL EVERY 4 HOURS PRN
Status: DISCONTINUED | OUTPATIENT
Start: 2024-07-25 | End: 2024-07-26 | Stop reason: HOSPADM

## 2024-07-25 RX ORDER — BISACODYL 5 MG/1
5 TABLET, DELAYED RELEASE ORAL DAILY PRN
Status: DISCONTINUED | OUTPATIENT
Start: 2024-07-25 | End: 2024-07-26 | Stop reason: HOSPADM

## 2024-07-25 RX ORDER — POTASSIUM CHLORIDE 20 MEQ/1
40 TABLET, EXTENDED RELEASE ORAL EVERY 4 HOURS
Status: COMPLETED | OUTPATIENT
Start: 2024-07-25 | End: 2024-07-25

## 2024-07-25 RX ORDER — POLYETHYLENE GLYCOL 3350 17 G/17G
17 POWDER, FOR SOLUTION ORAL DAILY PRN
Status: DISCONTINUED | OUTPATIENT
Start: 2024-07-25 | End: 2024-07-26 | Stop reason: HOSPADM

## 2024-07-25 RX ORDER — ENOXAPARIN SODIUM 100 MG/ML
40 INJECTION SUBCUTANEOUS EVERY 24 HOURS
Status: DISCONTINUED | OUTPATIENT
Start: 2024-07-25 | End: 2024-07-26 | Stop reason: HOSPADM

## 2024-07-25 RX ADMIN — Medication 10 ML: at 20:05

## 2024-07-25 RX ADMIN — POTASSIUM CHLORIDE 40 MEQ: 1500 TABLET, EXTENDED RELEASE ORAL at 10:58

## 2024-07-25 RX ADMIN — SODIUM CHLORIDE 1000 ML: 9 INJECTION, SOLUTION INTRAVENOUS at 08:25

## 2024-07-25 RX ADMIN — CEFTRIAXONE 1000 MG: 1 INJECTION, POWDER, FOR SOLUTION INTRAMUSCULAR; INTRAVENOUS at 00:34

## 2024-07-25 RX ADMIN — SODIUM CHLORIDE 100 ML/HR: 9 INJECTION, SOLUTION INTRAVENOUS at 19:47

## 2024-07-25 RX ADMIN — SODIUM CHLORIDE 1000 ML: 9 INJECTION, SOLUTION INTRAVENOUS at 02:50

## 2024-07-25 RX ADMIN — ENOXAPARIN SODIUM 40 MG: 100 INJECTION SUBCUTANEOUS at 05:32

## 2024-07-25 RX ADMIN — IOPAMIDOL 100 ML: 612 INJECTION, SOLUTION INTRAVENOUS at 00:06

## 2024-07-25 RX ADMIN — ACETAMINOPHEN 650 MG: 325 TABLET, FILM COATED ORAL at 09:49

## 2024-07-25 RX ADMIN — SODIUM CHLORIDE 100 ML/HR: 9 INJECTION, SOLUTION INTRAVENOUS at 05:05

## 2024-07-25 RX ADMIN — POTASSIUM CHLORIDE 40 MEQ: 1500 TABLET, EXTENDED RELEASE ORAL at 15:43

## 2024-07-25 NOTE — CASE MANAGEMENT/SOCIAL WORK
Discharge Planning Assessment  [facility]     Patient Name: Tonya Richardson  MRN: [id number]  Today's Date: 7/25/2024    Admit Date: 7/24/2024    Plan: Dcp initiated at bedside, pt provided demographics. She does not have an AD, POA, home DME, or financial stressors. [facility] is her primary and [facility] is pharmacy used, enrolled in meds to bed. Pt has been at current residence for 1 yr, she resides with her partner Alan Singh. She plans to return home at [place], no concerns voiced.   Discharge Needs Assessment       Row Name 07/25/24 1030       Living Environment    People in Home significant other    Name(s) of People in Home Alan Singh    Current Living Arrangements home    Duration at Residence 1 yr    Potentially Unsafe Housing Conditions none    In the past 12 months has the electric, gas, oil, or water company threatened to shut off services in your home? No    Primary Care Provided by self    Family Caregiver if Needed significant other    Quality of Family Relationships helpful;involved    Able to Return to Prior Arrangements yes       Transportation Needs    In the past 12 months, has lack of transportation kept you from medical appointments or from getting medications? no    In the past 12 months, has lack of transportation kept you from meetings, work, or from getting things needed for daily living? No       Food Insecurity    Within the past 12 months, you worried that your food would run out before you got the money to buy more. Never true    Within the past 12 months, the food you bought just didn't last and you didn't have money to get more. Never true       Transition Planning    Patient/Family Anticipates Transition to home    Patient/Family Anticipated Services at Transition none    Transportation Anticipated family or friend will provide       Discharge Needs Assessment    Readmission Within the Last 30 Days no previous admission in last 30 days    Anticipated Changes Related to Illness none     Equipment Needed After Discharge none                   Discharge Plan       Row Name 07/25/24 1035       Plan    Plan Dcp initiated at bedside, pt provided demographics. She does not have an AD, POA, home DME, or financial stressors. [facility] is her primary and [facility] is pharmacy used, enrolled in meds to bed. Pt has been at current residence for 1 yr, she resides with her partner Alan Singh. She plans to return home at [place], no concerns voiced.                  Continued Care and Services - Admitted Since 7/24/2024    No active coordination exists for this encounter.          Demographic Summary       Row Name 07/25/24 1029       General Information    Admission Type inpatient    Arrived From emergency department    Referral Source admission list    Reason for Consult discharge planning    Preferred Language English       Contact Information    Permission Granted to Share Info With family/designee                   Functional Status       Row Name 07/25/24 1030       Functional Status    Usual Activity Tolerance good    Current Activity Tolerance good       Physical Activity    On average, how many days per week do you engage in moderate to strenuous exercise (like a brisk walk)? 5 days    On average, how many minutes do you engage in exercise at this level? 20 min    Number of minutes of exercise per week 100       Functional Status, IADL    Medications independent    Meal Preparation independent    Housekeeping independent    Laundry independent    Shopping independent       Mental Status    General Appearance WDL WDL       Mental Status Summary    Recent Changes in Mental Status/Cognitive Functioning no changes       Employment/    Employment Status unemployed                   Psychosocial    No documentation.                  Abuse/Neglect    No documentation.                  Legal    No documentation.                  Substance Abuse    No documentation.                  Patient Forms     No documentation.                     Lashawn Chau RN

## 2024-07-25 NOTE — ED PROVIDER NOTES
EMERGENCY DEPARTMENT ENCOUNTER    Pt Name: Tonya Richardson  MRN: [id number]  Pt :   1985  Room Number:  W210/1  Date of encounter:  2024  PCP: Gold Jordan APRN  ED Provider: Layo Carter MD    Historian: Patient      HPI:  Chief Complaint: Flank pain, headache, fever        Context: Tonya Richardson is a 39 y.o. female who presents to the ED c/o flank pain, headache and fever.  Patient has a past history significant for hepatitis C and pyelonephritis.  She says over the past 2 weeks she has had bilateral flank pain.  She says she completed a course of Keflex last week for UTI.  She says that over the past several days she is felt worse and has been running a fever.  She says today her fever was 102 at home.  She says she is felt nauseous but denies vomiting.  She says that she is having pain in the right flank that radiates to her abdomen.      PAST MEDICAL HISTORY  Past Medical History:   Diagnosis Date    Hepatitis C          PAST SURGICAL HISTORY  History reviewed. No pertinent surgical history.      FAMILY HISTORY  History reviewed. No pertinent family history.      SOCIAL HISTORY  Social History     Socioeconomic History    Marital status:    Tobacco Use    Smoking status: Former     Current packs/day: 0.50     Average packs/day: 0.5 packs/day for 10.0 years (5.0 ttl pk-yrs)     Types: Cigarettes    Smokeless tobacco: Current   Vaping Use    Vaping status: Every Day    Substances: Nicotine   Substance and Sexual Activity    Alcohol use: Not Currently     Comment: on occasion    Drug use: Yes     Frequency: 5.0 times per week     Types: Methamphetamines     Comment: smokes meth 4x week    Sexual activity: Defer         ALLERGIES  Phenergan [promethazine hcl]        REVIEW OF SYSTEMS    All systems reviewed and negative except for those discussed in HPI.       PHYSICAL EXAM    I have reviewed the triage vital signs and nursing notes.    ED Triage Vitals [24 2150]   Temp Heart Rate Resp BP  SpO2   98.5 °F (36.9 °C) (!) 126 18 98/77 99 %      Temp src Heart Rate Source Patient Position BP Location FiO2 (%)   Oral Monitor Sitting Left arm --         General: Moderate acute distress  Skin: normal color, tactile fever  Head: normocephalic, atraumatic  Eyes: Pupils equally round and reactive to light.  Nose: normal nasal mucosa, no visible deformity.  Mouth: dry mucous membranes.  Neck: supple.  Chest: no retractions, no visible deformity  Cardiovascular: Tachycardic, regular rhythm  Lungs: clear to auscultation bilaterally.  Back: no contusions, wounds or abrasions.  Bilateral CVA tenderness.  Abdomen: soft, non-tender, non-distended. No rebound tenderness, no guarding.  No peritonitis.  Extremities: no cyanosis or edema. Palpable radial pulses bilaterally. Palpable dorsalis pedis pulses bilaterally.  Neuro:  alert and oriented x3, no focal neurological deficits.  Psych:  appropriate mood and behavior.        LAB RESULTS  Recent Results (from the past 24 hour(s))   Comprehensive Metabolic Panel    Collection Time: 07/24/24 10:05 PM    Specimen: Blood   Result Value Ref Range    Glucose 117 (H) 65 - 99 mg/dL    BUN 13 6 - 20 mg/dL    Creatinine 1.00 0.57 - 1.00 mg/dL    Sodium 131 (L) 136 - 145 mmol/L    Potassium 4.0 3.5 - 5.2 mmol/L    Chloride 95 (L) 98 - 107 mmol/L    CO2 25.1 22.0 - 29.0 mmol/L    Calcium 9.0 8.6 - 10.5 mg/dL    Total Protein 7.1 6.0 - 8.5 g/dL    Albumin 3.6 3.5 - 5.2 g/dL    ALT (SGPT) 16 1 - 33 U/L    AST (SGOT) 21 1 - 32 U/L    Alkaline Phosphatase 146 (H) 39 - 117 U/L    Total Bilirubin 0.8 0.0 - 1.2 mg/dL    Globulin 3.5 gm/dL    A/G Ratio 1.0 g/dL    BUN/Creatinine Ratio 13.0 7.0 - 25.0    Anion Gap 10.9 5.0 - 15.0 mmol/L    eGFR 73.2 >60.0 mL/min/1.73   Lipase    Collection Time: 07/24/24 10:05 PM    Specimen: Blood   Result Value Ref Range    Lipase 10 (L) 13 - 60 U/L   Green Top (Gel)    Collection Time: 07/24/24 10:05 PM   Result Value Ref Range    Extra Tube Hold for  add-ons.    Lavender Top    Collection Time: 07/24/24 10:05 PM   Result Value Ref Range    Extra Tube hold for add-on    Gold Top - SST    Collection Time: 07/24/24 10:05 PM   Result Value Ref Range    Extra Tube Hold for add-ons.    Light Blue Top    Collection Time: 07/24/24 10:05 PM   Result Value Ref Range    Extra Tube Hold for add-ons.    CBC Auto Differential    Collection Time: 07/24/24 10:05 PM    Specimen: Blood   Result Value Ref Range    WBC 29.77 (H) 3.40 - 10.80 10*3/mm3    RBC 3.70 (L) 3.77 - 5.28 10*6/mm3    Hemoglobin 11.4 (L) 12.0 - 15.9 g/dL    Hematocrit 33.3 (L) 34.0 - 46.6 %    MCV 90.0 79.0 - 97.0 fL    MCH 30.8 26.6 - 33.0 pg    MCHC 34.2 31.5 - 35.7 g/dL    RDW 12.6 12.3 - 15.4 %    RDW-SD 41.3 37.0 - 54.0 fl    MPV 9.4 6.0 - 12.0 fL    Platelets 366 140 - 450 10*3/mm3    Neutrophil % 88.9 (H) 42.7 - 76.0 %    Lymphocyte % 3.0 (L) 19.6 - 45.3 %    Monocyte % 6.2 5.0 - 12.0 %    Eosinophil % 0.1 (L) 0.3 - 6.2 %    Basophil % 0.2 0.0 - 1.5 %    Immature Grans % 1.6 (H) 0.0 - 0.5 %    Neutrophils, Absolute 26.48 (H) 1.70 - 7.00 10*3/mm3    Lymphocytes, Absolute 0.88 0.70 - 3.10 10*3/mm3    Monocytes, Absolute 1.86 (H) 0.10 - 0.90 10*3/mm3    Eosinophils, Absolute 0.02 0.00 - 0.40 10*3/mm3    Basophils, Absolute 0.06 0.00 - 0.20 10*3/mm3    Immature Grans, Absolute 0.47 (H) 0.00 - 0.05 10*3/mm3    nRBC 0.0 0.0 - 0.2 /100 WBC   Urinalysis With Microscopic If Indicated (No Culture) - Urine, Clean Catch    Collection Time: 07/24/24 10:09 PM    Specimen: Urine, Clean Catch   Result Value Ref Range    Color, UA Dark Yellow (A) Yellow, Straw    Appearance, UA Turbid (A) Clear    pH, UA 6.0 5.0 - 8.0    Specific Gravity, UA 1.018 1.005 - 1.030    Glucose, UA Negative Negative    Ketones, UA Negative Negative    Bilirubin, UA Small (1+) (A) Negative    Blood, UA Moderate (2+) (A) Negative    Protein,  mg/dL (2+) (A) Negative    Leuk Esterase, UA Moderate (2+) (A) Negative    Nitrite, UA Positive  (A) Negative    Urobilinogen, UA 1.0 E.U./dL 0.2 - 1.0 E.U./dL   Urinalysis, Microscopic Only - Urine, Clean Catch    Collection Time: 07/24/24 10:09 PM    Specimen: Urine, Clean Catch   Result Value Ref Range    RBC, UA 3-5 (A) None Seen, 0-2 /HPF    WBC, UA 11-20 (A) None Seen, 0-2 /HPF    Bacteria, UA 3+ (A) None Seen /HPF    Squamous Epithelial Cells, UA 7-12 (A) None Seen, 0-2 /HPF    Hyaline Casts, UA None Seen None Seen /LPF    Methodology Manual Light Microscopy    Pregnancy, Urine - Urine, Clean Catch    Collection Time: 07/24/24 10:09 PM    Specimen: Urine, Clean Catch   Result Value Ref Range    HCG, Urine QL Negative Negative   Lactic Acid, Plasma    Collection Time: 07/25/24 12:15 AM    Specimen: Blood   Result Value Ref Range    Lactate 1.7 0.5 - 2.0 mmol/L       If labs were ordered, I independently reviewed the results and considered them in treating the patient.  See medical decision making discussion section for my interpretation of lab results.        RADIOLOGY  CT Abdomen Pelvis With Contrast    Result Date: 7/25/2024  FINAL REPORT TECHNIQUE: null CLINICAL HISTORY: R flank pain, leukocytosis, UTI, hx of pyelonephritis COMPARISON: null FINDINGS: CT Abdomen and Pelvis W Contrast COMPARISON: CT/SR - CT ABDOMEN PELVIS WO CONTRAST - 07/23/2023 10:14 PM EDT FINDINGS: Normal liver. Normal spleen. Patchy bilateral renal cortical hypoenhancement, more extensive on the right than the left. Right perinephric fat stranding. No hydronephrosis. Normal adrenal glands. Normal pancreas. No visible gallstones. No biliary dilation. No evidence of bowel obstruction or colitis. Moderate stool in the colon. The appendix is not identified, however, no secondary signs of acute appendicitis. Unremarkable bladder. Unremarkable uterus. Small physiologic free fluid in the pelvis. No pneumoperitoneum. No lymphadenopathy. No acute fracture. No abdominal aortic aneurysm.     IMPRESSION: Findings consistent with right worse  than left pyelonephritis. Nonemergent/incidental findings above. Authenticated and Electronically Signed by Aaron Gonzalez MD on 07/25/2024 12:42:26 AM     I ordered and independently reviewed the above noted radiographic studies.  See radiologist's dictation for official interpretation.            PROCEDURES    Procedures    ECG 12 Lead Tachycardia   Final Result          MEDICATIONS GIVEN IN ER    Medications   sodium chloride 0.9 % flush 10 mL (has no administration in time range)   sodium chloride 0.9 % bolus 1,000 mL ( Intravenous Currently Infusing 7/25/24 0054)   ketorolac (TORADOL) injection 15 mg (15 mg Intravenous Given 7/24/24 2318)   acetaminophen (TYLENOL) tablet 975 mg (975 mg Oral Given 7/24/24 2318)   cefTRIAXone (ROCEPHIN) 1,000 mg in sodium chloride 0.9 % 100 mL IVPB-VTB (0 mg Intravenous Stopped 7/25/24 0054)   iopamidol (ISOVUE-300) 61 % injection 100 mL (100 mL Intravenous Given 7/25/24 0006)   sodium chloride 0.9 % bolus 1,000 mL (1,000 mL Intravenous New Bag 7/25/24 0250)         MEDICAL DECISION MAKING, PROGRESS, and CONSULTS    All labs, if obtained, have been independently reviewed by me.  All radiology studies, if obtained, have been reviewed by me and the radiologist dictating the report.  All EKG's, if obtained, have been independently viewed and interpreted by me/my attending physician.      Discussion below represents my analysis of pertinent findings related to patient's condition, differential diagnosis, treatment plan and final disposition.                         Differential diagnosis:    Differential diagnosis for this patient includes hepatitis, cholangitis, cholecystitis, pancreatitis, gastritis, enteritis, colitis, gastroenteritis, appendicitis, volvulus, obstruction, ischemia, torsion, cystitis, pyelonephritis, nephrolithiasis, uretolithiasis, ectopic pregnancy, cervicitis, PID, other acute emergency.    Medical Decision Making Discussion:    Patient vitals are reviewed and  demonstrate tachycardia.    Labs reviewed which demonstrate significant leukocytosis urinalysis is positive for infection.  UPT is negative.    CT scan is obtained which demonstrates pyelonephritis.  There is no obstructive uropathy.    Given her significant tachycardia with leukocytosis she meets criteria for sepsis.  Blood cultures were obtained and she was started on broad-spectrum IV antibiotics.    Case discussed with Dr. Garduno who has accepted the patient for hospitalization.      45 minutes of critical care provided. This time excludes other billable procedures. Time does include preparation of documents, medical consultations, review of old records, and direct bedside care. Patient is at high risk for life-threatening deterioration due to sepsis, pyelonephritis.      Additional sources:    - Discussed/ obtained information from independent historians: Dr. Garduno    - External (non-ED) record review: History physical from July 2023 documenting history of IV drug abuse, hepatitis C.  Presented with right flank pain.  Diagnosed with right pyelonephritis.  Reviewed associated discharge summary which showed that urine culture grew E. coli susceptible to Rocephin.  Blood cultures were negative.  Charged with cefdinir.    Shared Decision Making:  After my consideration of clinical presentation and any laboratory/radiology studies obtained, I discussed the findings with the patient/patient representative who is in agreement with the treatment plan and the final disposition.   Risks and benefits of discharge and/or observation/admission were discussed.    Orders placed during this visit:  Orders Placed This Encounter   Procedures    Urine Culture - Urine, Urine, Clean Catch    Blood Culture With JOB - Blood,    Blood Culture With JOB - Blood,    CT Abdomen Pelvis With Contrast    Harbinger Draw    Comprehensive Metabolic Panel    Lipase    Urinalysis With Microscopic If Indicated (No Culture) - Urine, Clean Catch     CBC Auto Differential    Urinalysis, Microscopic Only - Urine, Clean Catch    Pregnancy, Urine - Urine, Clean Catch    Lactic Acid, Plasma    NPO Diet NPO Type: Strict NPO    Undress & Gown    Inpatient Consult to Advance Care Planning    ECG 12 Lead Tachycardia    Insert Peripheral IV    Inpatient Admission    CBC & Differential    Green Top (Gel)    Lavender Top    Gold Top - SST    Light Blue Top     AS OF 04:32 EDT VITALS:    BP - 90/58  HR - 95  TEMP - 98.2 °F (36.8 °C) (Oral)  O2 SATS - 96%                  DIAGNOSIS  Final diagnoses:   Sepsis, due to unspecified organism, unspecified whether acute organ dysfunction present   Bandemia   Pyelonephritis         DISPOSITION  Admit      Please note that portions of this document were completed with voice recognition software.        Layo Carter MD  07/25/24 8065

## 2024-07-25 NOTE — H&P
Psychiatric HOSPITALIST   HISTORY AND PHYSICAL      Name:  Tonya Richardson   Age:  39 y.o.  Sex:  female  :  1985  MRN:  [id number]   Visit Number:  [id number]  Admission Date:  2024  Date Of Service:  24  Primary Care Physician:  Gold Jordan APRN    Chief Complaint:     Flank pain, fever    History Of Presenting Illness:      Patient is a 39 years old female with a past medical history of hepatitis C, illicit drug abuse, previous UTIs/sepsis requiring admission in this facility back in , who presented to the ER with a chief complaint of bilateral flanks and suprapubic pain.  Patient reports her pain started 5 days ago and has persisted.  Patient also reporting dysuria and burning on urination with increased frequency.  She had urinary symptoms 2 weeks ago prior to this for which she received 5 days of Keflex, she reports that her symptoms initially improved but started back 5 days ago.  She is also reporting associated fever with Tmax at home of 102F.  She reports feeling nauseous but no vomiting.    On ER evaluation, patient was tachycardic with a heart rate of 126, afebrile, blood pressure soft 98/77.  Her labs were significant for sodium 131, WBC 29.7, hemoglobin 11.4.  Lactate WNL.  Urinalysis positive for nitrates, leukocytes, WBC 11-20 with 3+ bacteria.  hCG urine negative.  CT abdomen pelvis showed findings consistent with right worse than left pyelonephritis.  Patient received 2 L boluses of normal saline, Toradol, acetaminophen and Rocephin while in the ER.  Hospitalist consulted for admission for sepsis/pyelonephritis, further management and treatment.    Review Of Systems:    All systems were reviewed and negative except as mentioned in history of presenting illness, assessment and plan.    Past Medical History: Patient  has a past medical history of Hepatitis C.    Past Surgical History: Patient  has no past surgical history on file.    Social History: Patient   "reports that she has quit smoking. Her smoking use included cigarettes. She has a 5 pack-year smoking history. She uses smokeless tobacco. She reports that she does not currently use alcohol. She reports current drug use. Frequency: 5.00 times per week. Drug: Methamphetamines.    Family History:  Patient's family history has been reviewed and found to be noncontributory.     Allergies:      Phenergan [promethazine hcl]    Home Medications:    Prior to Admission Medications       Prescriptions Last Dose Informant Patient Reported? Taking?    atenolol (TENORMIN) 25 MG tablet   Yes No    Take 1 tablet by mouth 2 (Two) Times a Day As Needed (hypertension).          ED Medications:    Medications   sodium chloride 0.9 % flush 10 mL (has no administration in time range)   sodium chloride 0.9 % bolus 1,000 mL ( Intravenous Currently Infusing 7/25/24 0054)   ketorolac (TORADOL) injection 15 mg (15 mg Intravenous Given 7/24/24 2318)   acetaminophen (TYLENOL) tablet 975 mg (975 mg Oral Given 7/24/24 2318)   cefTRIAXone (ROCEPHIN) 1,000 mg in sodium chloride 0.9 % 100 mL IVPB-VTB (0 mg Intravenous Stopped 7/25/24 0054)   iopamidol (ISOVUE-300) 61 % injection 100 mL (100 mL Intravenous Given 7/25/24 0006)     Vital Signs:  Temp:  [98.5 °F (36.9 °C)] 98.5 °F (36.9 °C)  Heart Rate:  [105-126] 105  Resp:  [18] 18  BP: ()/(51-77) 91/55        07/24/24 2150   Weight: 54.4 kg (120 lb)     Body mass index is 19.97 kg/m².    Physical Exam:     Most recent vital Signs: BP 91/55   Pulse 105   Temp 98.5 °F (36.9 °C) (Oral)   Resp 18   Ht 165.1 cm (65\")   Wt 54.4 kg (120 lb)   LMP 06/25/2024   SpO2 98%   BMI 19.97 kg/m²     Physical Exam  Vitals and nursing note reviewed.   Constitutional:       General: She is not in acute distress.     Appearance: She is ill-appearing.   HENT:      Head: Normocephalic and atraumatic.      Right Ear: External ear normal.      Left Ear: External ear normal.      Nose: Nose normal.      " Mouth/Throat:      Mouth: Mucous membranes are moist.   Eyes:      Extraocular Movements: Extraocular movements intact.      Conjunctiva/sclera: Conjunctivae normal.      Pupils: Pupils are equal, round, and reactive to light.   Cardiovascular:      Rate and Rhythm: Normal rate and regular rhythm.      Pulses: Normal pulses.      Heart sounds: Normal heart sounds.   Pulmonary:      Effort: Pulmonary effort is normal. No respiratory distress.      Breath sounds: Normal breath sounds. No wheezing or rhonchi.   Abdominal:      General: Bowel sounds are normal. There is no distension.      Palpations: Abdomen is soft.      Tenderness: There is abdominal tenderness in the suprapubic area. There is right CVA tenderness and left CVA tenderness.   Musculoskeletal:         General: Normal range of motion.      Cervical back: Normal range of motion and neck supple.      Right lower leg: No edema.      Left lower leg: No edema.   Skin:     General: Skin is warm and dry.      Findings: No rash.   Neurological:      General: No focal deficit present.      Mental Status: She is alert and oriented to person, place, and time. Mental status is at baseline.      Motor: No weakness.   Psychiatric:         Mood and Affect: Mood normal.         Behavior: Behavior normal.         Thought Content: Thought content normal.         Laboratory data:    I have reviewed the labs done in the emergency room.    Results from last 7 days   Lab Units 07/24/24  2205   SODIUM mmol/L 131*   POTASSIUM mmol/L 4.0   CHLORIDE mmol/L 95*   CO2 mmol/L 25.1   BUN mg/dL 13   CREATININE mg/dL 1.00   CALCIUM mg/dL 9.0   BILIRUBIN mg/dL 0.8   ALK PHOS U/L 146*   ALT (SGPT) U/L 16   AST (SGOT) U/L 21   GLUCOSE mg/dL 117*     Results from last 7 days   Lab Units 07/24/24  2205   WBC 10*3/mm3 29.77*   HEMOGLOBIN g/dL 11.4*   HEMATOCRIT % 33.3*   PLATELETS 10*3/mm3 366                     Results from last 7 days   Lab Units 07/24/24  2205   LIPASE U/L 10*          Results from last 7 days   Lab Units 07/24/24  2209   COLOR UA  Dark Yellow*   GLUCOSE UA  Negative   KETONES UA  Negative   BLOOD UA  Moderate (2+)*   LEUKOCYTES UA  Moderate (2+)*   PH, URINE  6.0   BILIRUBIN UA  Small (1+)*   UROBILINOGEN UA  1.0 E.U./dL   RBC UA /HPF 3-5*   WBC UA /HPF 11-20*       Pain Management Panel  More data exists         Latest Ref Rng & Units 7/23/2023 12/25/2022   Pain Management Panel   Amphetamine, Urine Qual Negative Positive  Positive    Barbiturates Screen, Urine Negative Negative  Negative    Benzodiazepine Screen, Urine Negative Negative  Negative    Buprenorphine, Screen, Urine Negative Negative  Negative    Cocaine Screen, Urine Negative Positive  Negative    Methadone Screen , Urine Negative Negative  Negative    Methamphetamine, Ur Negative Positive  Positive        EKG:      Sinus tachycardia, heart rate 106, normal axis, nonspecific ST/T wave changes.    Radiology:    CT Abdomen Pelvis With Contrast    Result Date: 7/25/2024  FINAL REPORT TECHNIQUE: null CLINICAL HISTORY: R flank pain, leukocytosis, UTI, hx of pyelonephritis COMPARISON: null FINDINGS: CT Abdomen and Pelvis W Contrast COMPARISON: CT/SR - CT ABDOMEN PELVIS WO CONTRAST - 07/23/2023 10:14 PM EDT FINDINGS: Normal liver. Normal spleen. Patchy bilateral renal cortical hypoenhancement, more extensive on the right than the left. Right perinephric fat stranding. No hydronephrosis. Normal adrenal glands. Normal pancreas. No visible gallstones. No biliary dilation. No evidence of bowel obstruction or colitis. Moderate stool in the colon. The appendix is not identified, however, no secondary signs of acute appendicitis. Unremarkable bladder. Unremarkable uterus. Small physiologic free fluid in the pelvis. No pneumoperitoneum. No lymphadenopathy. No acute fracture. No abdominal aortic aneurysm.     IMPRESSION: Findings consistent with right worse than left pyelonephritis. Nonemergent/incidental findings above.  Authenticated and Electronically Signed by Aaron Gonzalez MD on 07/25/2024 12:42:26 AM     Assessment:    Bilateral pyelonephritis/UTI, POA  Sepsis, secondary to #1, POA  History of illicit drug abuse  Hepatitis C  Hypertension    Plan:    Patient is admitted for further management and treatment.    Bilateral pyelonephritis/UTI/sepsis  -Patient received IV fluids boluses in the ER per sepsis protocol  -Continue with maintenance IV fluids  -Coverage with Rocephin  -Follow-up on blood and urine cultures  -Previous urine cultures on file with E. coli and sensitive to cephalosporin.  -UDS pending  -Complicates all aspects of care    -Continue home meds as warranted.  -Further orders as indicated per clinical course.    Risk Assessment: High  DVT Prophylaxis: Lovenox prophylaxis (benefit> risk)  Code Status: Full  Diet: Cardiac    Advance Care Planning   ACP discussion was held with the patient during this visit. Patient does not have an advance directive, information provided.       Dara Garduno MD  07/25/24  01:03 EDT    Dictated utilizing Dragon dictation.

## 2024-07-25 NOTE — PAYOR COMM NOTE
"Tonya Oliveira (39 y.o. Female)       Date of Birth   1985    Social Security Number       Address   [address] [facility] [address]    Home Phone   [phone]    MRN   [id number]       [place]   [employer]    Marital Status                               Admission Date   24    Admission Type   Emergency    Admitting Provider   Dara Garduno MD    Attending Provider   Clarence Raymond DO    Department, Room/Bed   [facility] OB GYN, W210/1       Discharge Date       Discharge Disposition       Discharge Destination                                 Attending Provider: Clarence Raymond DO    Allergies: Phenergan [Promethazine Hcl]    Isolation: None   Infection: None   Code Status: CPR    Ht: 165.1 cm (65\")   Wt: 51.8 kg (114 lb 3.2 oz)    Admission Cmt: None   Principal Problem: UTI (urinary tract infection) [N39.0]                   Active Insurance as of 2024       Primary Coverage       Payor Plan Insurance Group Employer/Plan Group    [facility] BY MALKA [id number]       Payor Plan Address Payor Plan Phone Number Payor Plan Fax Number Effective Dates    [address] [address]   2022 - None Entered    [facility] [id number]         Subscriber Name Subscriber Birth Date Member ID       TONYA OLIVEIRA 1985 [id number]                     Emergency Contacts        (Rel.) Home Phone Work Phone Mobile Phone    Nidhi Tan (Mother) [phone]    FranciscoAlan (Significant Other) [phone] -- --          ICD 10 code\"  N39.0    Dr Garduno  NPI:  [id number]    [place]: NPI [id number] Tax ID [id number]     History & Physical        Dara Garduno MD at 24 0103            [facility] HOSPITALIST   HISTORY AND PHYSICAL      Name:  Tonya Oliveira   Age:  39 y.o.  Sex:  female  :  1985  MRN:  [id number]   Visit Number:  [id number]  Admission Date:  2024  Date Of Service:  " 07/25/24  Primary Care Physician:  Gold Jordan APRN    Chief Complaint:     Flank pain, fever    History Of Presenting Illness:      Patient is a 39 years old female with a past medical history of hepatitis C, illicit drug abuse, previous UTIs/sepsis requiring admission in this facility back in 2023, who presented to the ER with a chief complaint of bilateral flanks and suprapubic pain.  Patient reports her pain started 5 days ago and has persisted.  Patient also reporting dysuria and burning on urination with increased frequency.  She had urinary symptoms 2 weeks ago prior to this for which she received 5 days of Keflex, she reports that her symptoms initially improved but started back 5 days ago.  She is also reporting associated fever with Tmax at home of 102F.  She reports feeling nauseous but no vomiting.    On ER evaluation, patient was tachycardic with a heart rate of 126, afebrile, blood pressure soft 98/77.  Her labs were significant for sodium 131, WBC 29.7, hemoglobin 11.4.  Lactate WNL.  Urinalysis positive for nitrates, leukocytes, WBC 11-20 with 3+ bacteria.  hCG urine negative.  CT abdomen pelvis showed findings consistent with right worse than left pyelonephritis.  Patient received 2 L boluses of normal saline, Toradol, acetaminophen and Rocephin while in the ER.  Hospitalist consulted for admission for sepsis/pyelonephritis, further management and treatment.    Review Of Systems:    All systems were reviewed and negative except as mentioned in history of presenting illness, assessment and plan.    Past Medical History: Patient  has a past medical history of Hepatitis C.    Past Surgical History: Patient  has no past surgical history on file.    Social History: Patient  reports that she has quit smoking. Her smoking use included cigarettes. She has a 5 pack-year smoking history. She uses smokeless tobacco. She reports that she does not currently use alcohol. She reports current drug use.  "Frequency: 5.00 times per week. Drug: Methamphetamines.    Family History:  Patient's family history has been reviewed and found to be noncontributory.     Allergies:      Phenergan [promethazine hcl]    Home Medications:    Prior to Admission Medications       Prescriptions Last Dose Informant Patient Reported? Taking?    atenolol (TENORMIN) 25 MG tablet   Yes No    Take 1 tablet by mouth 2 (Two) Times a Day As Needed (hypertension).          ED Medications:    Medications   sodium chloride 0.9 % flush 10 mL (has no administration in time range)   sodium chloride 0.9 % bolus 1,000 mL ( Intravenous Currently Infusing 7/25/24 0054)   ketorolac (TORADOL) injection 15 mg (15 mg Intravenous Given 7/24/24 2318)   acetaminophen (TYLENOL) tablet 975 mg (975 mg Oral Given 7/24/24 2318)   cefTRIAXone (ROCEPHIN) 1,000 mg in sodium chloride 0.9 % 100 mL IVPB-VTB (0 mg Intravenous Stopped 7/25/24 0054)   iopamidol (ISOVUE-300) 61 % injection 100 mL (100 mL Intravenous Given 7/25/24 0006)     Vital Signs:  Temp:  [98.5 °F (36.9 °C)] 98.5 °F (36.9 °C)  Heart Rate:  [105-126] 105  Resp:  [18] 18  BP: ()/(51-77) 91/55        07/24/24 2150   Weight: 54.4 kg (120 lb)     Body mass index is 19.97 kg/m².    Physical Exam:     Most recent vital Signs: BP 91/55   Pulse 105   Temp 98.5 °F (36.9 °C) (Oral)   Resp 18   Ht 165.1 cm (65\")   Wt 54.4 kg (120 lb)   LMP 06/25/2024   SpO2 98%   BMI 19.97 kg/m²     Physical Exam  Vitals and nursing note reviewed.   Constitutional:       General: She is not in acute distress.     Appearance: She is ill-appearing.   HENT:      Head: Normocephalic and atraumatic.      Right Ear: External ear normal.      Left Ear: External ear normal.      Nose: Nose normal.      Mouth/Throat:      Mouth: Mucous membranes are moist.   Eyes:      Extraocular Movements: Extraocular movements intact.      Conjunctiva/sclera: Conjunctivae normal.      Pupils: Pupils are equal, round, and reactive to light. "   Cardiovascular:      Rate and Rhythm: Normal rate and regular rhythm.      Pulses: Normal pulses.      Heart sounds: Normal heart sounds.   Pulmonary:      Effort: Pulmonary effort is normal. No respiratory distress.      Breath sounds: Normal breath sounds. No wheezing or rhonchi.   Abdominal:      General: Bowel sounds are normal. There is no distension.      Palpations: Abdomen is soft.      Tenderness: There is abdominal tenderness in the suprapubic area. There is right CVA tenderness and left CVA tenderness.   Musculoskeletal:         General: Normal range of motion.      Cervical back: Normal range of motion and neck supple.      Right lower leg: No edema.      Left lower leg: No edema.   Skin:     General: Skin is warm and dry.      Findings: No rash.   Neurological:      General: No focal deficit present.      Mental Status: She is alert and oriented to person, place, and time. Mental status is at baseline.      Motor: No weakness.   Psychiatric:         Mood and Affect: Mood normal.         Behavior: Behavior normal.         Thought Content: Thought content normal.         Laboratory data:    I have reviewed the labs done in the emergency room.    Results from last 7 days   Lab Units 07/24/24  2205   SODIUM mmol/L 131*   POTASSIUM mmol/L 4.0   CHLORIDE mmol/L 95*   CO2 mmol/L 25.1   BUN mg/dL 13   CREATININE mg/dL 1.00   CALCIUM mg/dL 9.0   BILIRUBIN mg/dL 0.8   ALK PHOS U/L 146*   ALT (SGPT) U/L 16   AST (SGOT) U/L 21   GLUCOSE mg/dL 117*     Results from last 7 days   Lab Units 07/24/24  2205   WBC 10*3/mm3 29.77*   HEMOGLOBIN g/dL 11.4*   HEMATOCRIT % 33.3*   PLATELETS 10*3/mm3 366                     Results from last 7 days   Lab Units 07/24/24  2205   LIPASE U/L 10*         Results from last 7 days   Lab Units 07/24/24  2209   COLOR UA  Dark Yellow*   GLUCOSE UA  Negative   KETONES UA  Negative   BLOOD UA  Moderate (2+)*   LEUKOCYTES UA  Moderate (2+)*   PH, URINE  6.0   BILIRUBIN UA  Small (1+)*    UROBILINOGEN UA  1.0 E.U./dL   RBC UA /HPF 3-5*   WBC UA /HPF 11-20*       Pain Management Panel  More data exists         Latest Ref Rng & Units 7/23/2023 12/25/2022   Pain Management Panel   Amphetamine, Urine Qual Negative Positive  Positive    Barbiturates Screen, Urine Negative Negative  Negative    Benzodiazepine Screen, Urine Negative Negative  Negative    Buprenorphine, Screen, Urine Negative Negative  Negative    Cocaine Screen, Urine Negative Positive  Negative    Methadone Screen , Urine Negative Negative  Negative    Methamphetamine, Ur Negative Positive  Positive        EKG:      Sinus tachycardia, heart rate 106, normal axis, nonspecific ST/T wave changes.    Radiology:    CT Abdomen Pelvis With Contrast    Result Date: 7/25/2024  FINAL REPORT TECHNIQUE: null CLINICAL HISTORY: R flank pain, leukocytosis, UTI, hx of pyelonephritis COMPARISON: null FINDINGS: CT Abdomen and Pelvis W Contrast COMPARISON: CT/SR - CT ABDOMEN PELVIS WO CONTRAST - 07/23/2023 10:14 PM EDT FINDINGS: Normal liver. Normal spleen. Patchy bilateral renal cortical hypoenhancement, more extensive on the right than the left. Right perinephric fat stranding. No hydronephrosis. Normal adrenal glands. Normal pancreas. No visible gallstones. No biliary dilation. No evidence of bowel obstruction or colitis. Moderate stool in the colon. The appendix is not identified, however, no secondary signs of acute appendicitis. Unremarkable bladder. Unremarkable uterus. Small physiologic free fluid in the pelvis. No pneumoperitoneum. No lymphadenopathy. No acute fracture. No abdominal aortic aneurysm.     IMPRESSION: Findings consistent with right worse than left pyelonephritis. Nonemergent/incidental findings above. Authenticated and Electronically Signed by Aaron Gonzalez MD on 07/25/2024 12:42:26 AM     Assessment:    Bilateral pyelonephritis/UTI, POA  Sepsis, secondary to #1, POA  History of illicit drug abuse  Hepatitis  C  Hypertension    Plan:    Patient is admitted for further management and treatment.    Bilateral pyelonephritis/UTI/sepsis  -Patient received IV fluids boluses in the ER per sepsis protocol  -Continue with maintenance IV fluids  -Coverage with Rocephin  -Follow-up on blood and urine cultures  -Previous urine cultures on file with E. coli and sensitive to cephalosporin.  -UDS pending  -Complicates all aspects of care    -Continue home meds as warranted.  -Further orders as indicated per clinical course.    Risk Assessment: High  DVT Prophylaxis: Lovenox prophylaxis (benefit> risk)  Code Status: Full  Diet: Cardiac    Advance Care Planning  ACP discussion was held with the patient during this visit. Patient does not have an advance directive, information provided.       Dara Garduno MD  24  01:03 EDT    Dictated utilizing Dragon dictation.    Electronically signed by Dara Garduno MD at 24 0509          Emergency Department Notes        Venus Thakkar RN at 24 0144          Report given at this time.    Electronically signed by Venus Thakkar RN at 24 0148       Layo Carter MD at 24 2345           EMERGENCY DEPARTMENT ENCOUNTER    Pt Name: oTnya Richardson  MRN: [id number]  Pt :   1985  Room Number:  W210/1  Date of encounter:  2024  PCP: Gold Jordan APRN  ED Provider: Layo Carter MD    Historian: Patient      HPI:  Chief Complaint: Flank pain, headache, fever        Context: Tonya Richardson is a 39 y.o. female who presents to the ED c/o flank pain, headache and fever.  Patient has a past history significant for hepatitis C and pyelonephritis.  She says over the past 2 weeks she has had bilateral flank pain.  She says she completed a course of Keflex last week for UTI.  She says that over the past several days she is felt worse and has been running a fever.  She says today her fever was 102 at home.  She says she is felt nauseous but denies vomiting.  She says that  she is having pain in the right flank that radiates to her abdomen.      PAST MEDICAL HISTORY  Past Medical History:   Diagnosis Date    Hepatitis C          PAST SURGICAL HISTORY  History reviewed. No pertinent surgical history.      FAMILY HISTORY  History reviewed. No pertinent family history.      SOCIAL HISTORY  Social History     Socioeconomic History    Marital status:    Tobacco Use    Smoking status: Former     Current packs/day: 0.50     Average packs/day: 0.5 packs/day for 10.0 years (5.0 ttl pk-yrs)     Types: Cigarettes    Smokeless tobacco: Current   Vaping Use    Vaping status: Every Day    Substances: Nicotine   Substance and Sexual Activity    Alcohol use: Not Currently     Comment: on occasion    Drug use: Yes     Frequency: 5.0 times per week     Types: Methamphetamines     Comment: smokes meth 4x week    Sexual activity: Defer         ALLERGIES  Phenergan [promethazine hcl]        REVIEW OF SYSTEMS    All systems reviewed and negative except for those discussed in HPI.       PHYSICAL EXAM    I have reviewed the triage vital signs and nursing notes.    ED Triage Vitals [07/24/24 2150]   Temp Heart Rate Resp BP SpO2   98.5 °F (36.9 °C) (!) 126 18 98/77 99 %      Temp src Heart Rate Source Patient Position BP Location FiO2 (%)   Oral Monitor Sitting Left arm --         General: Moderate acute distress  Skin: normal color, tactile fever  Head: normocephalic, atraumatic  Eyes: Pupils equally round and reactive to light.  Nose: normal nasal mucosa, no visible deformity.  Mouth: dry mucous membranes.  Neck: supple.  Chest: no retractions, no visible deformity  Cardiovascular: Tachycardic, regular rhythm  Lungs: clear to auscultation bilaterally.  Back: no contusions, wounds or abrasions.  Bilateral CVA tenderness.  Abdomen: soft, non-tender, non-distended. No rebound tenderness, no guarding.  No peritonitis.  Extremities: no cyanosis or edema. Palpable radial pulses bilaterally. Palpable  dorsalis pedis pulses bilaterally.  Neuro:  alert and oriented x3, no focal neurological deficits.  Psych:  appropriate mood and behavior.        LAB RESULTS  Recent Results (from the past 24 hour(s))   Comprehensive Metabolic Panel    Collection Time: 07/24/24 10:05 PM    Specimen: Blood   Result Value Ref Range    Glucose 117 (H) 65 - 99 mg/dL    BUN 13 6 - 20 mg/dL    Creatinine 1.00 0.57 - 1.00 mg/dL    Sodium 131 (L) 136 - 145 mmol/L    Potassium 4.0 3.5 - 5.2 mmol/L    Chloride 95 (L) 98 - 107 mmol/L    CO2 25.1 22.0 - 29.0 mmol/L    Calcium 9.0 8.6 - 10.5 mg/dL    Total Protein 7.1 6.0 - 8.5 g/dL    Albumin 3.6 3.5 - 5.2 g/dL    ALT (SGPT) 16 1 - 33 U/L    AST (SGOT) 21 1 - 32 U/L    Alkaline Phosphatase 146 (H) 39 - 117 U/L    Total Bilirubin 0.8 0.0 - 1.2 mg/dL    Globulin 3.5 gm/dL    A/G Ratio 1.0 g/dL    BUN/Creatinine Ratio 13.0 7.0 - 25.0    Anion Gap 10.9 5.0 - 15.0 mmol/L    eGFR 73.2 >60.0 mL/min/1.73   Lipase    Collection Time: 07/24/24 10:05 PM    Specimen: Blood   Result Value Ref Range    Lipase 10 (L) 13 - 60 U/L   Green Top (Gel)    Collection Time: 07/24/24 10:05 PM   Result Value Ref Range    Extra Tube Hold for add-ons.    Lavender Top    Collection Time: 07/24/24 10:05 PM   Result Value Ref Range    Extra Tube hold for add-on    Gold Top - SST    Collection Time: 07/24/24 10:05 PM   Result Value Ref Range    Extra Tube Hold for add-ons.    Light Blue Top    Collection Time: 07/24/24 10:05 PM   Result Value Ref Range    Extra Tube Hold for add-ons.    CBC Auto Differential    Collection Time: 07/24/24 10:05 PM    Specimen: Blood   Result Value Ref Range    WBC 29.77 (H) 3.40 - 10.80 10*3/mm3    RBC 3.70 (L) 3.77 - 5.28 10*6/mm3    Hemoglobin 11.4 (L) 12.0 - 15.9 g/dL    Hematocrit 33.3 (L) 34.0 - 46.6 %    MCV 90.0 79.0 - 97.0 fL    MCH 30.8 26.6 - 33.0 pg    MCHC 34.2 31.5 - 35.7 g/dL    RDW 12.6 12.3 - 15.4 %    RDW-SD 41.3 37.0 - 54.0 fl    MPV 9.4 6.0 - 12.0 fL    Platelets 366 140 -  450 10*3/mm3    Neutrophil % 88.9 (H) 42.7 - 76.0 %    Lymphocyte % 3.0 (L) 19.6 - 45.3 %    Monocyte % 6.2 5.0 - 12.0 %    Eosinophil % 0.1 (L) 0.3 - 6.2 %    Basophil % 0.2 0.0 - 1.5 %    Immature Grans % 1.6 (H) 0.0 - 0.5 %    Neutrophils, Absolute 26.48 (H) 1.70 - 7.00 10*3/mm3    Lymphocytes, Absolute 0.88 0.70 - 3.10 10*3/mm3    Monocytes, Absolute 1.86 (H) 0.10 - 0.90 10*3/mm3    Eosinophils, Absolute 0.02 0.00 - 0.40 10*3/mm3    Basophils, Absolute 0.06 0.00 - 0.20 10*3/mm3    Immature Grans, Absolute 0.47 (H) 0.00 - 0.05 10*3/mm3    nRBC 0.0 0.0 - 0.2 /100 WBC   Urinalysis With Microscopic If Indicated (No Culture) - Urine, Clean Catch    Collection Time: 07/24/24 10:09 PM    Specimen: Urine, Clean Catch   Result Value Ref Range    Color, UA Dark Yellow (A) Yellow, Straw    Appearance, UA Turbid (A) Clear    pH, UA 6.0 5.0 - 8.0    Specific Gravity, UA 1.018 1.005 - 1.030    Glucose, UA Negative Negative    Ketones, UA Negative Negative    Bilirubin, UA Small (1+) (A) Negative    Blood, UA Moderate (2+) (A) Negative    Protein,  mg/dL (2+) (A) Negative    Leuk Esterase, UA Moderate (2+) (A) Negative    Nitrite, UA Positive (A) Negative    Urobilinogen, UA 1.0 E.U./dL 0.2 - 1.0 E.U./dL   Urinalysis, Microscopic Only - Urine, Clean Catch    Collection Time: 07/24/24 10:09 PM    Specimen: Urine, Clean Catch   Result Value Ref Range    RBC, UA 3-5 (A) None Seen, 0-2 /HPF    WBC, UA 11-20 (A) None Seen, 0-2 /HPF    Bacteria, UA 3+ (A) None Seen /HPF    Squamous Epithelial Cells, UA 7-12 (A) None Seen, 0-2 /HPF    Hyaline Casts, UA None Seen None Seen /LPF    Methodology Manual Light Microscopy    Pregnancy, Urine - Urine, Clean Catch    Collection Time: 07/24/24 10:09 PM    Specimen: Urine, Clean Catch   Result Value Ref Range    HCG, Urine QL Negative Negative   Lactic Acid, Plasma    Collection Time: 07/25/24 12:15 AM    Specimen: Blood   Result Value Ref Range    Lactate 1.7 0.5 - 2.0 mmol/L       If  labs were ordered, I independently reviewed the results and considered them in treating the patient.  See medical decision making discussion section for my interpretation of lab results.        RADIOLOGY  CT Abdomen Pelvis With Contrast    Result Date: 7/25/2024  FINAL REPORT TECHNIQUE: null CLINICAL HISTORY: R flank pain, leukocytosis, UTI, hx of pyelonephritis COMPARISON: null FINDINGS: CT Abdomen and Pelvis W Contrast COMPARISON: CT/SR - CT ABDOMEN PELVIS WO CONTRAST - 07/23/2023 10:14 PM EDT FINDINGS: Normal liver. Normal spleen. Patchy bilateral renal cortical hypoenhancement, more extensive on the right than the left. Right perinephric fat stranding. No hydronephrosis. Normal adrenal glands. Normal pancreas. No visible gallstones. No biliary dilation. No evidence of bowel obstruction or colitis. Moderate stool in the colon. The appendix is not identified, however, no secondary signs of acute appendicitis. Unremarkable bladder. Unremarkable uterus. Small physiologic free fluid in the pelvis. No pneumoperitoneum. No lymphadenopathy. No acute fracture. No abdominal aortic aneurysm.     IMPRESSION: Findings consistent with right worse than left pyelonephritis. Nonemergent/incidental findings above. Authenticated and Electronically Signed by Aaron Gonzalez MD on 07/25/2024 12:42:26 AM     I ordered and independently reviewed the above noted radiographic studies.  See radiologist's dictation for official interpretation.            PROCEDURES    Procedures    ECG 12 Lead Tachycardia   Final Result          MEDICATIONS GIVEN IN ER    Medications   sodium chloride 0.9 % flush 10 mL (has no administration in time range)   sodium chloride 0.9 % bolus 1,000 mL ( Intravenous Currently Infusing 7/25/24 0054)   ketorolac (TORADOL) injection 15 mg (15 mg Intravenous Given 7/24/24 2318)   acetaminophen (TYLENOL) tablet 975 mg (975 mg Oral Given 7/24/24 2318)   cefTRIAXone (ROCEPHIN) 1,000 mg in sodium chloride 0.9 % 100 mL  IVPB-VTB (0 mg Intravenous Stopped 7/25/24 0054)   iopamidol (ISOVUE-300) 61 % injection 100 mL (100 mL Intravenous Given 7/25/24 0006)   sodium chloride 0.9 % bolus 1,000 mL (1,000 mL Intravenous New Bag 7/25/24 0250)         MEDICAL DECISION MAKING, PROGRESS, and CONSULTS    All labs, if obtained, have been independently reviewed by me.  All radiology studies, if obtained, have been reviewed by me and the radiologist dictating the report.  All EKG's, if obtained, have been independently viewed and interpreted by me/my attending physician.      Discussion below represents my analysis of pertinent findings related to patient's condition, differential diagnosis, treatment plan and final disposition.                         Differential diagnosis:    Differential diagnosis for this patient includes hepatitis, cholangitis, cholecystitis, pancreatitis, gastritis, enteritis, colitis, gastroenteritis, appendicitis, volvulus, obstruction, ischemia, torsion, cystitis, pyelonephritis, nephrolithiasis, uretolithiasis, ectopic pregnancy, cervicitis, PID, other acute emergency.    Medical Decision Making Discussion:    Patient vitals are reviewed and demonstrate tachycardia.    Labs reviewed which demonstrate significant leukocytosis urinalysis is positive for infection.  UPT is negative.    CT scan is obtained which demonstrates pyelonephritis.  There is no obstructive uropathy.    Given her significant tachycardia with leukocytosis she meets criteria for sepsis.  Blood cultures were obtained and she was started on broad-spectrum IV antibiotics.    Case discussed with Dr. Garduno who has accepted the patient for hospitalization.      45 minutes of critical care provided. This time excludes other billable procedures. Time does include preparation of documents, medical consultations, review of old records, and direct bedside care. Patient is at high risk for life-threatening deterioration due to sepsis, pyelonephritis.       Additional sources:    - Discussed/ obtained information from independent historians: Dr. Garduno    - External (non-ED) record review: History physical from July 2023 documenting history of IV drug abuse, hepatitis C.  Presented with right flank pain.  Diagnosed with right pyelonephritis.  Reviewed associated discharge summary which showed that urine culture grew E. coli susceptible to Rocephin.  Blood cultures were negative.  Charged with cefdinir.    Shared Decision Making:  After my consideration of clinical presentation and any laboratory/radiology studies obtained, I discussed the findings with the patient/patient representative who is in agreement with the treatment plan and the final disposition.   Risks and benefits of discharge and/or observation/admission were discussed.    Orders placed during this visit:  Orders Placed This Encounter   Procedures    Urine Culture - Urine, Urine, Clean Catch    Blood Culture With JOB - Blood,    Blood Culture With JOB - Blood,    CT Abdomen Pelvis With Contrast    [place] Draw    Comprehensive Metabolic Panel    Lipase    Urinalysis With Microscopic If Indicated (No Culture) - Urine, Clean Catch    CBC Auto Differential    Urinalysis, Microscopic Only - Urine, Clean Catch    Pregnancy, Urine - Urine, Clean Catch    Lactic Acid, Plasma    NPO Diet NPO Type: Strict NPO    Undress & Gown    Inpatient Consult to Advance Care Planning    ECG 12 Lead Tachycardia    Insert Peripheral IV    Inpatient Admission    CBC & Differential    Green Top (Gel)    Lavender Top    Gold Top - SST    Light Blue Top     AS OF 04:32 EDT VITALS:    BP - 90/58  HR - 95  TEMP - 98.2 °F (36.8 °C) (Oral)  O2 SATS - 96%                  DIAGNOSIS  Final diagnoses:   Sepsis, due to unspecified organism, unspecified whether acute organ dysfunction present   Bandemia   Pyelonephritis         DISPOSITION  Admit      Please note that portions of this document were completed with voice recognition  software.        Layo Carter MD  07/25/24 0432      Electronically signed by Layo Carter MD at 07/25/24 0432       Vital Signs (last day)       Date/Time Temp Temp src Pulse Resp BP Patient Position SpO2    07/25/24 1212 98.4 (36.9) Oral 95 18 101/62 Lying 96    07/25/24 0949 -- -- -- -- 97/66 -- --    07/25/24 0753 98.6 (37) Oral 93 18 88/52 Lying 100    07/25/24 0202 98.2 (36.8) Oral 95 18 90/58 Lying 96    07/25/24 0141 -- -- 99 -- -- -- 96    07/25/24 0113 -- -- 103 -- -- -- 98    07/25/24 0111 -- -- 102 -- -- -- 97    07/25/24 0100 -- -- 105 -- 91/55 -- 98    07/25/24 0030 -- -- 106 -- 98/51 -- 100    07/25/24 0006 -- -- 108 -- -- -- 99    07/24/24 2353 -- -- 110 -- -- -- 98    07/24/24 2331 -- -- 105 -- -- -- 99    07/24/24 2330 -- -- -- -- 93/57 -- --    07/24/24 2254 -- -- 109 -- -- -- --    07/24/24 2253 -- -- -- -- -- -- 98    07/24/24 2252 -- -- -- -- 100/70 -- --    07/24/24 2150 98.5 (36.9) Oral 126 18 98/77 Sitting 99          Lab Results (last 24 hours)       Procedure Component Value Units Date/Time    Blood Culture With JOB - Blood, Wrist, Left [[id number]]  (Normal) Collected: 07/25/24 0015    Specimen: Blood from Wrist, Left Updated: 07/25/24 1230     Blood Culture No growth at less than 24 hours    Narrative:      Less than seven (7) mL's of blood was collected.  Insufficient quantity may yield false negative results.    Blood Culture With JOB - Blood, Wrist, Right [[id number]]  (Normal) Collected: 07/25/24 0019    Specimen: Blood from Wrist, Right Updated: 07/25/24 1230     Blood Culture No growth at less than 24 hours    Basic Metabolic Panel [[id number]]  (Abnormal) Collected: 07/25/24 0849    Specimen: Blood Updated: 07/25/24 0926     Glucose 137 mg/dL      BUN 16 mg/dL      Creatinine 0.87 mg/dL      Sodium 137 mmol/L      Potassium 3.5 mmol/L      Chloride 102 mmol/L      CO2 22.9 mmol/L      Calcium 7.8 mg/dL      BUN/Creatinine Ratio 18.4     Anion Gap 12.1 mmol/L      eGFR 86.5  mL/min/1.73     Narrative:      GFR Normal >60  Chronic Kidney Disease <60  Kidney Failure <15      CBC (No Diff) [[id number]]  (Abnormal) Collected: 07/25/24 0849    Specimen: Blood Updated: 07/25/24 0908     WBC 15.92 10*3/mm3      RBC 3.49 10*6/mm3      Hemoglobin 10.6 g/dL      Hematocrit 31.7 %      MCV 90.8 fL      MCH 30.4 pg      MCHC 33.4 g/dL      RDW 12.8 %      RDW-SD 41.5 fl      MPV 9.7 fL      Platelets 261 10*3/mm3     Urine Drug Screen - Urine, Clean Catch [[id number]]  (Abnormal) Collected: 07/24/24 2209    Specimen: Urine, Clean Catch Updated: 07/25/24 0527     THC, Screen, Urine Negative     Phencyclidine (PCP), Urine Negative     Cocaine Screen, Urine Negative     Methamphetamine, Ur Positive     Opiate Screen Negative     Amphetamine Screen, Urine Positive     Benzodiazepine Screen, Urine Negative     Tricyclic Antidepressants Screen Negative     Methadone Screen, Urine Negative     Barbiturates Screen, Urine Negative     Oxycodone Screen, Urine Negative     Buprenorphine, Screen, Urine Negative    Narrative:      Cutoff For Drugs Screened:    Amphetamines               500 ng/ml  Barbiturates               200 ng/ml  Benzodiazepines            150 ng/ml  Cocaine                    150 ng/ml  Methadone                  200 ng/ml  Opiates                    100 ng/ml  Phencyclidine               25 ng/ml  THC                         50 ng/ml  Methamphetamine            500 ng/ml  Tricyclic Antidepressants  300 ng/ml  Oxycodone                  100 ng/ml  Buprenorphine               10 ng/ml    The normal value for all drugs tested is negative. This report includes unconfirmed screening results, with the cutoff values listed, to be used for medical treatment purposes only.  Unconfirmed results must not be used for non-medical purposes such as employment or legal testing.  Clinical consideration should be applied to any drug of abuse test, particularly when unconfirmed results are used.       Fentanyl, Urine - Urine, Clean Catch [[id number]]  (Abnormal) Collected: 07/24/24 2209    Specimen: Urine, Clean Catch Updated: 07/25/24 0518     Fentanyl, Urine Positive    Narrative:      Negative Threshold:      Fentanyl 5 ng/mL     The normal value for the drug tested is negative. This report includes final unconfirmed screening results to be used for medical treatment purposes only. Unconfirmed results must not be used for non-medical purposes such as employment or legal testing. Clinical consideration should be applied to any drug of abuse test, particularly when unconfirmed results are used.           Lactic Acid, Plasma [[id number]]  (Normal) Collected: 07/25/24 0015    Specimen: Blood Updated: 07/25/24 0040     Lactate 1.7 mmol/L     Urine Culture - Urine, Urine, Clean Catch [[id number]] Collected: 07/24/24 2209    Specimen: Urine, Clean Catch Updated: 07/24/24 2356    Pregnancy, Urine - Urine, Clean Catch [[id number]]  (Normal) Collected: 07/24/24 2209    Specimen: Urine, Clean Catch Updated: 07/24/24 2253     HCG, Urine QL Negative    Comprehensive Metabolic Panel [[id number]]  (Abnormal) Collected: 07/24/24 2205    Specimen: Blood Updated: 07/24/24 2227     Glucose 117 mg/dL      BUN 13 mg/dL      Creatinine 1.00 mg/dL      Sodium 131 mmol/L      Potassium 4.0 mmol/L      Chloride 95 mmol/L      CO2 25.1 mmol/L      Calcium 9.0 mg/dL      Total Protein 7.1 g/dL      Albumin 3.6 g/dL      ALT (SGPT) 16 U/L      AST (SGOT) 21 U/L      Alkaline Phosphatase 146 U/L      Total Bilirubin 0.8 mg/dL      Globulin 3.5 gm/dL      A/G Ratio 1.0 g/dL      BUN/Creatinine Ratio 13.0     Anion Gap 10.9 mmol/L      eGFR 73.2 mL/min/1.73     Narrative:      GFR Normal >60  Chronic Kidney Disease <60  Kidney Failure <15      Lipase [[id number]]  (Abnormal) Collected: 07/24/24 2205    Specimen: Blood Updated: 07/24/24 2227     Lipase 10 U/L     Urinalysis, Microscopic Only - Urine, Clean Catch [[id number]]  (Abnormal) Collected:  07/24/24 2209    Specimen: Urine, Clean Catch Updated: 07/24/24 2222     RBC, UA 3-5 /HPF      WBC, UA 11-20 /HPF      Bacteria, UA 3+ /HPF      Squamous Epithelial Cells, UA 7-12 /HPF      Hyaline Casts, UA None Seen /LPF      Methodology Manual Light Microscopy    Urinalysis With Microscopic If Indicated (No Culture) - Urine, Clean Catch [[id number]]  (Abnormal) Collected: 07/24/24 2209    Specimen: Urine, Clean Catch Updated: 07/24/24 2221     Color, UA Dark Yellow     Appearance, UA Turbid     pH, UA 6.0     Specific Gravity, UA 1.018     Glucose, UA Negative     Ketones, UA Negative     Bilirubin, UA Small (1+)     Blood, UA Moderate (2+)     Protein,  mg/dL (2+)     Leuk Esterase, UA Moderate (2+)     Nitrite, UA Positive     Urobilinogen, UA 1.0 E.U./dL    [place] Draw [[id number]] Collected: 07/24/24 2205    Specimen: Blood Updated: 07/24/24 2215    Narrative:      The following orders were created for panel order [place] Draw.  Procedure                               Abnormality         Status                     ---------                               -----------         ------                     Green Top (Gel)[[id number]]                                  Final result               Lavender Top[[id number]]                                     Final result               Gold Top - SST[[id number]]                                   Final result               Light Blue Top[[id number]]                                   Final result                 Please view results for these tests on the individual orders.    Green Top (Gel) [[id number]] Collected: 07/24/24 2205    Specimen: Blood Updated: 07/24/24 2215     Extra Tube Hold for add-ons.     Comment: Auto resulted.       Lavender Top [[id number]] Collected: 07/24/24 2205    Specimen: Blood Updated: 07/24/24 2215     Extra Tube hold for add-on     Comment: Auto resulted       Gold Top - SST [[id number]] Collected: 07/24/24 2205    Specimen: Blood Updated:  07/24/24 2215     Extra Tube Hold for add-ons.     Comment: Auto resulted.       Light Blue Top [[id number]] Collected: 07/24/24 2205    Specimen: Blood Updated: 07/24/24 2215     Extra Tube Hold for add-ons.     Comment: Auto resulted       CBC & Differential [[id number]]  (Abnormal) Collected: 07/24/24 2205    Specimen: Blood Updated: 07/24/24 2213    Narrative:      The following orders were created for panel order CBC & Differential.  Procedure                               Abnormality         Status                     ---------                               -----------         ------                     CBC Auto Differential[[id number]]        Abnormal            Final result                 Please view results for these tests on the individual orders.    CBC Auto Differential [[id number]]  (Abnormal) Collected: 07/24/24 2205    Specimen: Blood Updated: 07/24/24 2213     WBC 29.77 10*3/mm3      RBC 3.70 10*6/mm3      Hemoglobin 11.4 g/dL      Hematocrit 33.3 %      MCV 90.0 fL      MCH 30.8 pg      MCHC 34.2 g/dL      RDW 12.6 %      RDW-SD 41.3 fl      MPV 9.4 fL      Platelets 366 10*3/mm3      Neutrophil % 88.9 %      Lymphocyte % 3.0 %      Monocyte % 6.2 %      Eosinophil % 0.1 %      Basophil % 0.2 %      Immature Grans % 1.6 %      Neutrophils, Absolute 26.48 10*3/mm3      Lymphocytes, Absolute 0.88 10*3/mm3      Monocytes, Absolute 1.86 10*3/mm3      Eosinophils, Absolute 0.02 10*3/mm3      Basophils, Absolute 0.06 10*3/mm3      Immature Grans, Absolute 0.47 10*3/mm3      nRBC 0.0 /100 WBC           Imaging Results (Last 24 Hours)       Procedure Component Value Units Date/Time    CT Abdomen Pelvis With Contrast [[id number]] Collected: 07/25/24 0042     Updated: 07/25/24 0044    Narrative:      FINAL REPORT    TECHNIQUE:  null    CLINICAL HISTORY:  R flank pain, leukocytosis, UTI, hx of pyelonephritis    COMPARISON:  null    FINDINGS:  CT Abdomen and Pelvis W Contrast    COMPARISON: CT/SR - CT ABDOMEN  PELVIS WO CONTRAST - 07/23/2023 10:14 PM EDT    FINDINGS:    Normal liver.    Normal spleen.    Patchy bilateral renal cortical hypoenhancement, more extensive on the right than the left. Right perinephric fat stranding. No hydronephrosis.    Normal adrenal glands.    Normal pancreas.    No visible gallstones. No biliary dilation.    No evidence of bowel obstruction or colitis. Moderate stool in the colon.    The appendix is not identified, however, no secondary signs of acute appendicitis.    Unremarkable bladder.    Unremarkable uterus.    Small physiologic free fluid in the pelvis. No pneumoperitoneum.    No lymphadenopathy.    No acute fracture.    No abdominal aortic aneurysm.      Impression:      IMPRESSION:    Findings consistent with right worse than left pyelonephritis.    Nonemergent/incidental findings above.    Authenticated and Electronically Signed by Aaron Gonzalez MD on  07/25/2024 12:42:26 AM

## 2024-07-25 NOTE — PLAN OF CARE
Goal Outcome Evaluation:  Plan of Care Reviewed With: patient           Outcome Evaluation: Patient BP low this morning, Dr. Raymond notified, bolus completed and BP improved to WDL. No other acute events noted at this time.

## 2024-07-26 VITALS
BODY MASS INDEX: 19.03 KG/M2 | WEIGHT: 114.2 LBS | TEMPERATURE: 98 F | OXYGEN SATURATION: 96 % | HEART RATE: 80 BPM | SYSTOLIC BLOOD PRESSURE: 124 MMHG | DIASTOLIC BLOOD PRESSURE: 82 MMHG | RESPIRATION RATE: 17 BRPM | HEIGHT: 65 IN

## 2024-07-26 LAB
ANION GAP SERPL CALCULATED.3IONS-SCNC: 8.6 MMOL/L (ref 5–15)
BASOPHILS # BLD AUTO: 0.04 10*3/MM3 (ref 0–0.2)
BASOPHILS NFR BLD AUTO: 0.3 % (ref 0–1.5)
BUN SERPL-MCNC: 13 MG/DL (ref 6–20)
BUN/CREAT SERPL: 16.5 (ref 7–25)
CALCIUM SPEC-SCNC: 8.3 MG/DL (ref 8.6–10.5)
CHLORIDE SERPL-SCNC: 109 MMOL/L (ref 98–107)
CO2 SERPL-SCNC: 20.4 MMOL/L (ref 22–29)
CREAT SERPL-MCNC: 0.79 MG/DL (ref 0.57–1)
DEPRECATED RDW RBC AUTO: 44.4 FL (ref 37–54)
EGFRCR SERPLBLD CKD-EPI 2021: 97.1 ML/MIN/1.73
EOSINOPHIL # BLD AUTO: 0.07 10*3/MM3 (ref 0–0.4)
EOSINOPHIL NFR BLD AUTO: 0.6 % (ref 0.3–6.2)
ERYTHROCYTE [DISTWIDTH] IN BLOOD BY AUTOMATED COUNT: 13.1 % (ref 12.3–15.4)
GLUCOSE SERPL-MCNC: 86 MG/DL (ref 65–99)
HCT VFR BLD AUTO: 31.5 % (ref 34–46.6)
HGB BLD-MCNC: 10.4 G/DL (ref 12–15.9)
IMM GRANULOCYTES # BLD AUTO: 0.08 10*3/MM3 (ref 0–0.05)
IMM GRANULOCYTES NFR BLD AUTO: 0.6 % (ref 0–0.5)
LYMPHOCYTES # BLD AUTO: 2.06 10*3/MM3 (ref 0.7–3.1)
LYMPHOCYTES NFR BLD AUTO: 16.5 % (ref 19.6–45.3)
MCH RBC QN AUTO: 30.8 PG (ref 26.6–33)
MCHC RBC AUTO-ENTMCNC: 33 G/DL (ref 31.5–35.7)
MCV RBC AUTO: 93.2 FL (ref 79–97)
MONOCYTES # BLD AUTO: 1.28 10*3/MM3 (ref 0.1–0.9)
MONOCYTES NFR BLD AUTO: 10.2 % (ref 5–12)
NEUTROPHILS NFR BLD AUTO: 71.8 % (ref 42.7–76)
NEUTROPHILS NFR BLD AUTO: 8.96 10*3/MM3 (ref 1.7–7)
NRBC BLD AUTO-RTO: 0 /100 WBC (ref 0–0.2)
PLATELET # BLD AUTO: 243 10*3/MM3 (ref 140–450)
PMV BLD AUTO: 10.1 FL (ref 6–12)
POTASSIUM SERPL-SCNC: 4.1 MMOL/L (ref 3.5–5.2)
RBC # BLD AUTO: 3.38 10*6/MM3 (ref 3.77–5.28)
SODIUM SERPL-SCNC: 138 MMOL/L (ref 136–145)
WBC NRBC COR # BLD AUTO: 12.49 10*3/MM3 (ref 3.4–10.8)

## 2024-07-26 PROCEDURE — 96372 THER/PROPH/DIAG INJ SC/IM: CPT

## 2024-07-26 PROCEDURE — 25010000002 ENOXAPARIN PER 10 MG: Performed by: FAMILY MEDICINE

## 2024-07-26 PROCEDURE — 25010000002 KETOROLAC TROMETHAMINE PER 15 MG: Performed by: FAMILY MEDICINE

## 2024-07-26 PROCEDURE — 96361 HYDRATE IV INFUSION ADD-ON: CPT

## 2024-07-26 PROCEDURE — 85025 COMPLETE CBC W/AUTO DIFF WBC: CPT | Performed by: INTERNAL MEDICINE

## 2024-07-26 PROCEDURE — 96376 TX/PRO/DX INJ SAME DRUG ADON: CPT

## 2024-07-26 PROCEDURE — 99239 HOSP IP/OBS DSCHRG MGMT >30: CPT | Performed by: INTERNAL MEDICINE

## 2024-07-26 PROCEDURE — 80048 BASIC METABOLIC PNL TOTAL CA: CPT | Performed by: INTERNAL MEDICINE

## 2024-07-26 PROCEDURE — 25010000002 CEFTRIAXONE PER 250 MG: Performed by: INTERNAL MEDICINE

## 2024-07-26 PROCEDURE — 25810000003 SODIUM CHLORIDE 0.9 % SOLUTION: Performed by: FAMILY MEDICINE

## 2024-07-26 RX ORDER — KETOROLAC TROMETHAMINE 30 MG/ML
30 INJECTION, SOLUTION INTRAMUSCULAR; INTRAVENOUS ONCE AS NEEDED
Status: COMPLETED | OUTPATIENT
Start: 2024-07-26 | End: 2024-07-26

## 2024-07-26 RX ORDER — CEFDINIR 300 MG/1
300 CAPSULE ORAL 2 TIMES DAILY
Qty: 10 CAPSULE | Refills: 0 | Status: SHIPPED | OUTPATIENT
Start: 2024-07-26 | End: 2024-07-31

## 2024-07-26 RX ORDER — ONDANSETRON 4 MG/1
4 TABLET, ORALLY DISINTEGRATING ORAL EVERY 8 HOURS PRN
Qty: 30 TABLET | Refills: 0 | Status: SHIPPED | OUTPATIENT
Start: 2024-07-26

## 2024-07-26 RX ADMIN — ENOXAPARIN SODIUM 40 MG: 100 INJECTION SUBCUTANEOUS at 06:01

## 2024-07-26 RX ADMIN — ACETAMINOPHEN 650 MG: 325 TABLET, FILM COATED ORAL at 00:22

## 2024-07-26 RX ADMIN — SODIUM CHLORIDE 2000 MG: 9 INJECTION, SOLUTION INTRAVENOUS at 00:18

## 2024-07-26 RX ADMIN — KETOROLAC TROMETHAMINE 30 MG: 30 INJECTION, SOLUTION INTRAMUSCULAR; INTRAVENOUS at 01:51

## 2024-07-26 RX ADMIN — SODIUM CHLORIDE 100 ML/HR: 9 INJECTION, SOLUTION INTRAVENOUS at 06:17

## 2024-07-26 NOTE — DISCHARGE SUMMARY
[facility]OSPITALIST   DISCHARGE SUMMARY      Name:  Tonya Richardson   Age:  39 y.o.  Sex:  female  :  1985  MRN:  [id number]   Visit Number:  [id number]    Admission Date:  2024  Date of Discharge:  2024  Primary Care Physician:  Gold Jordan APRN      Discharge Diagnoses:     Bilateral pyelonephritis/UTI, POA  Sepsis, secondary to #1, POA  History of illicit drug abuse  Hepatitis C  Hypertension    Problem List:     Active Hospital Problems    Diagnosis  POA    **UTI (urinary tract infection) [N39.0]  Yes      Resolved Hospital Problems   No resolved problems to display.     Presenting Problem:    Chief Complaint   Patient presents with    Flank Pain    Headache      Consults:     Consulting Physician(s)                     None              Procedures Performed:        History of presenting illness/Hospital Course:    Patient is a 39 years old female with a past medical history of hepatitis C, illicit drug abuse, previous UTIs/sepsis requiring admission in this facility back in , who presented to the ER with a chief complaint of bilateral flanks and suprapubic pain.   On ER evaluation, patient was tachycardic with a heart rate of 126, afebrile, blood pressure soft 98/77.  Her labs were significant for sodium 131, WBC 29.7, hemoglobin 11.4.  Lactate WNL.  Urinalysis positive for nitrates, leukocytes, WBC 11-20 with 3+ bacteria.  hCG urine negative.  CT abdomen pelvis showed findings consistent with right worse than left pyelonephritis.  UDS positive for fentanyl, amphetamine and methamphetamine.  Patient received 2 L boluses of normal saline, Toradol, acetaminophen and Rocephin while in the ER.  Hospitalist consulted for admission for sepsis/pyelonephritis, further management and treatment.   Blood pressure stabilized with IV fluids.  Remained normotensive on day of discharge.  Urine cultures remain negative at 24 hours.  Urine culture still pending at time of discharge.   Will follow-up.  Previous urine cultures on file with E. coli and sensitive to cephalosporins.  Given that patient improved significantly with Rocephin, discharged home on Omnicef to complete full course of treatment.  Strict return instructions given.  Follow-up with primary physician.    Vital Signs:    Temp:  [97.6 °F (36.4 °C)-98.6 °F (37 °C)] 98 °F (36.7 °C)  Heart Rate:  [73-95] 80  Resp:  [16-18] 17  BP: ()/(62-82) 124/82    Physical Exam:    General Appearance:  Alert and cooperative.    Head:  Atraumatic and normocephalic.   Eyes: Conjunctivae and sclerae normal, no icterus. No pallor.   Ears:  Ears with no abnormalities noted.   Throat: No oral lesions, no thrush, oral mucosa moist.   Neck: Supple, trachea midline, no thyromegaly.   Back:   No kyphoscoliosis present. No tenderness to palpation.   Lungs:   Breath sounds heard bilaterally equally.  No crackles or wheezing. No Pleural rub or bronchial breathing.   Heart:  Normal S1 and S2, no murmur, no gallop, no rub. No JVD.   Abdomen:   Normal bowel sounds, no masses, no organomegaly. Soft, nontender, nondistended, no rebound tenderness.   Extremities: Supple, no edema, no cyanosis, no clubbing.   Pulses: Pulses palpable bilaterally.   Skin: No bleeding or rash.   Neurologic: Alert and oriented x 3. No facial asymmetry. Moves all four limbs. No tremors.     Pertinent Lab Results:     Results from last 7 days   Lab Units 07/26/24  0644 07/25/24  1941 07/25/24  0849 07/24/24  2205   SODIUM mmol/L 138  --  137 131*   POTASSIUM mmol/L 4.1 3.7 3.5 4.0   CHLORIDE mmol/L 109*  --  102 95*   CO2 mmol/L 20.4*  --  22.9 25.1   BUN mg/dL 13  --  16 13   CREATININE mg/dL 0.79  --  0.87 1.00   CALCIUM mg/dL 8.3*  --  7.8* 9.0   BILIRUBIN mg/dL  --   --   --  0.8   ALK PHOS U/L  --   --   --  146*   ALT (SGPT) U/L  --   --   --  16   AST (SGOT) U/L  --   --   --  21   GLUCOSE mg/dL 86  --  137* 117*     Results from last 7 days   Lab Units 07/26/24  6692  07/25/24  0849 07/24/24  2205   WBC 10*3/mm3 12.49* 15.92* 29.77*   HEMOGLOBIN g/dL 10.4* 10.6* 11.4*   HEMATOCRIT % 31.5* 31.7* 33.3*   PLATELETS 10*3/mm3 243 261 366                     Results from last 7 days   Lab Units 07/24/24  2205   LIPASE U/L 10*         Results from last 7 days   Lab Units 07/25/24  0019 07/25/24  0015   BLOODCX  No growth at 24 hours No growth at 24 hours       Pertinent Radiology Results:    Imaging Results (All)       Procedure Component Value Units Date/Time    CT Abdomen Pelvis With Contrast [[id number]] Collected: 07/25/24 0042     Updated: 07/25/24 0044    Narrative:      FINAL REPORT    TECHNIQUE:  null    CLINICAL HISTORY:  R flank pain, leukocytosis, UTI, hx of pyelonephritis    COMPARISON:  null    FINDINGS:  CT Abdomen and Pelvis W Contrast    COMPARISON: CT/SR - CT ABDOMEN PELVIS WO CONTRAST - 07/23/2023 10:14 PM EDT    FINDINGS:    Normal liver.    Normal spleen.    Patchy bilateral renal cortical hypoenhancement, more extensive on the right than the left. Right perinephric fat stranding. No hydronephrosis.    Normal adrenal glands.    Normal pancreas.    No visible gallstones. No biliary dilation.    No evidence of bowel obstruction or colitis. Moderate stool in the colon.    The appendix is not identified, however, no secondary signs of acute appendicitis.    Unremarkable bladder.    Unremarkable uterus.    Small physiologic free fluid in the pelvis. No pneumoperitoneum.    No lymphadenopathy.    No acute fracture.    No abdominal aortic aneurysm.      Impression:      IMPRESSION:    Findings consistent with right worse than left pyelonephritis.    Nonemergent/incidental findings above.    Authenticated and Electronically Signed by Aaron Gonzalez MD on  07/25/2024 12:42:26 AM            Echo:      Condition on Discharge:      Stable.    Code status during the hospital stay:    Code Status and Medical Interventions: CPR (Attempt to Resuscitate); Full Support   Ordered at:  07/25/24 0456     Code Status (Patient has no pulse and is not breathing):    CPR (Attempt to Resuscitate)     Medical Interventions (Patient has pulse or is breathing):    Full Support     Discharge Disposition:    Home or Self Care    Discharge Medications:       Discharge Medications        New Medications        Instructions Start Date   cefdinir 300 MG capsule  Commonly known as: OMNICEF   300 mg, Oral, 2 Times Daily      ondansetron ODT 4 MG disintegrating tablet  Commonly known as: ZOFRAN-ODT   4 mg, Translingual, Every 8 Hours PRN             Stop These Medications      atenolol 25 MG tablet  Commonly known as: TENORMIN            Discharge Diet:     Diet Instructions       Diet: Cardiac Diets; Healthy Heart (2-3 Na+); Regular (IDDSI 7); Thin (IDDSI 0)      Discharge Diet: Cardiac Diets    Cardiac Diet: Healthy Heart (2-3 Na+)    Texture: Regular (IDDSI 7)    Fluid Consistency: Thin (IDDSI 0)          Activity at Discharge:     Activity Instructions       Activity as Tolerated            Follow-up Appointments:    Additional Instructions for the Follow-ups that You Need to Schedule       Discharge Follow-up with PCP   As directed       Currently Documented PCP:    Gold Jordan APRN    PCP Phone Number:    [phone]     Follow Up Details: 1 week               Follow-up Information       Gold Jordan APRN .    Specialty: Nurse Practitioner  Why: 1 week  Contact information:  [address] [place], [place] [id number] [phone]                           No future appointments.  Test Results Pending at Discharge:    Pending Labs       Order Current Status    Urine Culture - Urine, Urine, Clean Catch In process    Blood Culture With JOB - Blood, Wrist, Left Preliminary result    Blood Culture With JOB - Blood, Wrist, Right Preliminary result               Clarence Raymond DO  07/26/24  11:08 EDT    Time: I spent >30 minutes on this discharge activity which included: face-to-face encounter with the patient,  reviewing the data in the system, coordination of the care with the nursing staff as well as consultants, documentation, and entering orders.     Dictated utilizing Dragon dictation.

## 2024-07-26 NOTE — CASE MANAGEMENT/SOCIAL WORK
Case Management Discharge Note                Selected Continued Care - Admitted Since 7/24/2024       Destination    No services have been selected for the patient.                Durable Medical Equipment    No services have been selected for the patient.                Dialysis/Infusion    No services have been selected for the patient.                Home Medical Care    No services have been selected for the patient.                Therapy    No services have been selected for the patient.                Community Resources    No services have been selected for the patient.                [facility] [place] have been selected for the patient[place]                    Transportation Services  Private: Car    Final Discharge Disposition Code: 01 - home or self-care

## 2024-07-28 LAB — BACTERIA SPEC AEROBE CULT: ABNORMAL

## 2024-07-30 LAB
BACTERIA SPEC AEROBE CULT: NORMAL
BACTERIA SPEC AEROBE CULT: NORMAL

## 2025-02-20 ENCOUNTER — INITIAL PRENATAL (OUTPATIENT)
Dept: OBSTETRICS AND GYNECOLOGY | Facility: CLINIC | Age: 40
End: 2025-02-20
Payer: COMMERCIAL

## 2025-02-20 ENCOUNTER — REFERRAL TRIAGE (OUTPATIENT)
Dept: LABOR AND DELIVERY | Facility: HOSPITAL | Age: 40
End: 2025-02-20
Payer: COMMERCIAL

## 2025-02-20 ENCOUNTER — PATIENT OUTREACH (OUTPATIENT)
Dept: LABOR AND DELIVERY | Facility: HOSPITAL | Age: 40
End: 2025-02-20
Payer: COMMERCIAL

## 2025-02-20 VITALS — WEIGHT: 114 LBS | SYSTOLIC BLOOD PRESSURE: 108 MMHG | BODY MASS INDEX: 18.97 KG/M2 | DIASTOLIC BLOOD PRESSURE: 64 MMHG

## 2025-02-20 DIAGNOSIS — Z86.19 HISTORY OF HEPATITIS C: ICD-10-CM

## 2025-02-20 DIAGNOSIS — O09.522 MULTIGRAVIDA OF ADVANCED MATERNAL AGE IN SECOND TRIMESTER: ICD-10-CM

## 2025-02-20 DIAGNOSIS — Z34.92 PRENATAL CARE IN SECOND TRIMESTER: Primary | ICD-10-CM

## 2025-02-20 DIAGNOSIS — Z34.90 PRENATAL CARE, ANTEPARTUM: ICD-10-CM

## 2025-02-20 DIAGNOSIS — O36.80X0 ENCOUNTER TO DETERMINE FETAL VIABILITY OF PREGNANCY, SINGLE OR UNSPECIFIED FETUS: ICD-10-CM

## 2025-02-20 DIAGNOSIS — F19.10 SUBSTANCE ABUSE: ICD-10-CM

## 2025-02-20 NOTE — OUTREACH NOTE
[facility]  Intake    Current Estimated Gestational Age: 15w3d    Intake Assessment      Flowsheet Row Responses   Best Method for Contacting Cell   Currently Employed No   Able to keep appointments as scheduled Yes   Do you have a dentist? No   Resources Presently Utilizing: None   Maternal Warning Signs Provided   Other Education How to find a dentist, How to find a pediatrician, How to find a primary care provider, Insurance benefits/Incentives, Mental Health Services, Smoking/Vaping Cessation, SNAP Benefits, Substance Use Disorder Treatment, WIC Benefits            Learning Assessment      Flowsheet Row Responses   Relationship Patient   Learner Name Tonya   Does the learner have any barriers to learning? No Barriers   What is the preferred language of the learner for medical teaching? English   Is an  required? No   How does the learner prefer to learn new concepts? Listening, Reading, Demonstration, Pictures/Video            Met with pt in office. Tonya is seeing Dr. Figueroa today for her prenatal care.  Her history is significant for substance abuse. Pt states she uses laverne and meth daily and wants to be referred for help. RN and provider discussed [facility] program and referral made.     She has no SI/HI and currently feels safe. Encouraged to consider seeing a mental health provider. Discussed that we will be screening periodically for  and postpartum changes with mood looking for trends which may need to be addressed. VU.    She is feeling well, reports good fetal movement. Prenatal and breastfeeding education discussed. Discussed bonus benefits of pregnancy from insurance for potential assistance with some infant care items.    Multiple resources provided via [employer] message. Encouraged to look at both the [employer] message and in the Visits tab for educational items pertaining to her pregnancy in the AVS. Contact information provided. Encouraged to call with questions, concerns, or  for support. Will plan f/u around 28 weeks for wellness and resource needs check in.    Tobacco, Alcohol, and Drug History     reports that she has been smoking cigarettes. She has a 5 pack-year smoking history. She uses smokeless tobacco.   reports that she does not currently use alcohol.   reports current drug use. Frequency: 5.00 times per week. Drug: Methamphetamines.    Sonia LIVE  Maternity Nurse Navigator    2/20/2025, 12:18 EST    [facility]  Enrollment    Current Estimated Gestational Age: 15w3d    Questions/Answers      Flowsheet Row Responses   Would like to participate? Yes   Date of Intake Visit 02/20/25                Sonia LIVE  Maternity Nurse Navigator    2/20/2025, 12:18 EST

## 2025-02-23 LAB
BACTERIA UR CULT: ABNORMAL
BACTERIA UR CULT: ABNORMAL
OTHER ANTIBIOTIC SUSC ISLT: ABNORMAL

## 2025-02-24 ENCOUNTER — TELEPHONE (OUTPATIENT)
Dept: OBSTETRICS AND GYNECOLOGY | Facility: CLINIC | Age: 40
End: 2025-02-24
Payer: COMMERCIAL

## 2025-02-24 DIAGNOSIS — N30.00 ACUTE CYSTITIS WITHOUT HEMATURIA: Primary | ICD-10-CM

## 2025-02-24 RX ORDER — NITROFURANTOIN 25; 75 MG/1; MG/1
100 CAPSULE ORAL 2 TIMES DAILY
Qty: 14 CAPSULE | Refills: 0 | Status: SHIPPED | OUTPATIENT
Start: 2025-02-24 | End: 2025-03-03

## 2025-02-24 NOTE — TELEPHONE ENCOUNTER
Caller: Tonya Richardson    Relationship: Self    Best call back number: [phone], OK TO LEAVE A VM       What is the best time to reach you: ANY    Who are you requesting to speak with (clinical staff, provider,  specific staff member): CLINICAL     Do you know the name of the person who called: NO    What was the call regarding: LABS

## 2025-02-25 PROBLEM — O09.522 MULTIGRAVIDA OF ADVANCED MATERNAL AGE IN SECOND TRIMESTER: Status: ACTIVE | Noted: 2025-02-25

## 2025-02-25 PROBLEM — F19.10 SUBSTANCE ABUSE: Status: ACTIVE | Noted: 2025-02-25

## 2025-02-25 PROBLEM — Z86.19 HISTORY OF HEPATITIS C: Status: ACTIVE | Noted: 2025-02-25

## 2025-02-25 NOTE — PROGRESS NOTES
New Pregnancy Visit    Subjective   Chief Complaint   Patient presents with    Initial Prenatal Visit     New OB - seeking treatment for substance use-       Tonya Richardson is a 39 y.o. year old .  Patient's last menstrual period was 2024.  She presents to initiate prenatal care with our group today.     No major symptoms today.  She has a history of substance abuse and needs to start maintenance therapy locally.  2 previous vaginal births.  Pelvis proven to 8 pounds 1 ounce.  History of hepatitis C, but unclear details.    Social History    Tobacco Use      Smoking status: Every Day        Packs/day: 0.50        Years: 0.5 packs/day for 10.0 years (5.0 ttl pk-yrs)        Types: Cigarettes      Smokeless tobacco: Current      No current outpatient medications on file prior to visit.     No current facility-administered medications on file prior to visit.          Objective   /64   Wt 51.7 kg (114 lb)   LMP 2024   BMI 18.97 kg/m²   Physical Exam:  Normal, gestational age-appropriate exam today        Medical Decision Making:    Lab Review:   No data reviewed    Note Review:  None    Imaging Review:  Pelvic ultrasound report  IUP measuring 15+3 weeks with appropriate fetal cardiac activity.  CORINA is set at 2025.  The cervix and bilateral ovaries are normal in appearance. No free fluid in the cul-de-sac.   Assessment   IUP at 15+3 weeks  Supervision of high risk pregnancy  History of substance abuse, desires maintenance therapy  History of HCV, unclear details  Advanced maternal age (AMA)     Plan    The problem list for pregnancy was initiated today  Tests/Orders/Rx for today:  Orders Placed This Encounter   Procedures    Chlamydia trachomatis, Neisseria gonorrhoeae, PCR w/ confirmation - Urine, Urine, Clean Catch     Order Specific Question:   Release to patient     Answer:   Routine Release [[id number]]    Urine Culture - Urine, Urine, Random Void     Order Specific Question:   Release  to patient     Answer:   Routine Release [1400000002]    US Ob Limited 1 + Fetuses     Order Specific Question:   Reason for Exam:     Answer:   NOB, dates, viability     Order Specific Question:   Release to patient     Answer:   Routine Release [1400000002]    OB Panel With HIV and RPR     Order Specific Question:   Release to patient     Answer:   Routine Release [1400000002]    Comprehensive Metabolic Panel     Order Specific Question:   Release to patient     Answer:   Routine Release [1400000002]    HCV Antibody Rfx To Qnt PCR     Order Specific Question:   Release to patient     Answer:   Routine Release [1400000002]    [id number] PLUS Core+SCA - Blood,     Order Specific Question:   [facility] Date of last menstrual period or estimated date of delivery (corresponding to calculation method):     Answer:   8/11/2025     Order Specific Question:   [facility] Gestational age calculation method:     Answer:   CORINA,EDC     Order Specific Question:   Release to patient     Answer:   Routine Release [1400000002]    Urine Drug Screen - Urine, Clean Catch     Order Specific Question:   Release to patient     Answer:   Routine Release [1400000002]    Ambulatory Referral to [facility] ([facility] only)     Referral Priority:   Routine     Referral Type:   Behavorial Health/Psych     Referral Reason:   Specialty Services Required     Requested Specialty:   Behavioral Health     Number of Visits Requested:   1       Medication Management: None    Testing for GC / Chlamydia / trichomonas was done today  Genetic testing reviewed: NIPT ([id number])  Information reviewed: exercise in pregnancy, nutrition in pregnancy, weight gain in pregnancy, work and travel restrictions during pregnancy, list of OTC medications acceptable in pregnancy, and call coverage groups  We discussed the [facility] program today  We discussed HCV today.  We will review lab results once available.    Follow up: 3-4 week(s) for anatomy ultrasound    Russell TSAI  MD [place]  Obstetrics and Gynecology  [facility]

## 2025-02-26 LAB
ABO GROUP BLD: ABNORMAL
ALBUMIN SERPL-MCNC: 3.7 G/DL (ref 3.9–4.9)
ALP SERPL-CCNC: 85 IU/L (ref 44–121)
ALT SERPL-CCNC: 19 IU/L (ref 0–32)
AMPHETAMINES UR QL SCN: POSITIVE NG/ML
AST SERPL-CCNC: 23 IU/L (ref 0–40)
BARBITURATES UR QL SCN: NEGATIVE NG/ML
BASOPHILS # BLD AUTO: 0 X10E3/UL (ref 0–0.2)
BASOPHILS NFR BLD AUTO: 0 %
BENZODIAZ UR QL SCN: NEGATIVE NG/ML
BILIRUB SERPL-MCNC: 0.3 MG/DL (ref 0–1.2)
BLD GP AB SCN SERPL QL: NEGATIVE
BUN SERPL-MCNC: 10 MG/DL (ref 6–24)
BUN/CREAT SERPL: 15 (ref 9–23)
BZE UR QL SCN: NEGATIVE NG/ML
C TRACH RRNA SPEC QL NAA+PROBE: NEGATIVE
CALCIUM SERPL-MCNC: 9.2 MG/DL (ref 8.7–10.2)
CANNABINOIDS UR QL SCN: NEGATIVE NG/ML
CFDNA.FET/CFDNA.TOTAL SFR FETUS: ABNORMAL %
CHLORIDE SERPL-SCNC: 103 MMOL/L (ref 96–106)
CITATION REF LAB TEST: ABNORMAL
CO2 SERPL-SCNC: 23 MMOL/L (ref 20–29)
CREAT SERPL-MCNC: 0.67 MG/DL (ref 0.57–1)
CREAT UR-MCNC: 171.7 MG/DL (ref 20–300)
DIAGNOSTIC IMP SPEC-IMP: NORMAL
EGFRCR SERPLBLD CKD-EPI 2021: 113 ML/MIN/1.73
EOSINOPHIL # BLD AUTO: 0 X10E3/UL (ref 0–0.4)
EOSINOPHIL NFR BLD AUTO: 1 %
ERYTHROCYTE [DISTWIDTH] IN BLOOD BY AUTOMATED COUNT: 12.8 % (ref 11.7–15.4)
FET 13+18+21+X+Y ANEUP PLAS.CFDNA: ABNORMAL
GA EST FROM CONCEPTION DATE: ABNORMAL D
GESTATIONAL AGE > 9:: YES
GLOBULIN SER CALC-MCNC: 2.6 G/DL (ref 1.5–4.5)
GLUCOSE SERPL-MCNC: 56 MG/DL (ref 70–99)
HBV SURFACE AG SERPL QL IA: NEGATIVE
HCT VFR BLD AUTO: 34 % (ref 34–46.6)
HCV IGG SERPL QL IA: REACTIVE
HCV RNA SERPL NAA+PROBE-ACNC: NORMAL IU/ML
HCV RNA SERPL NAA+PROBE-LOG IU: 6.37 LOG10 IU/ML
HGB BLD-MCNC: 11 G/DL (ref 11.1–15.9)
HIV 1+2 AB+HIV1 P24 AG SERPL QL IA: NON REACTIVE
IMM GRANULOCYTES # BLD AUTO: 0 X10E3/UL (ref 0–0.1)
IMM GRANULOCYTES NFR BLD AUTO: 0 %
LAB DIRECTOR NAME PROVIDER: ABNORMAL
LAB DIRECTOR NAME PROVIDER: ABNORMAL
LABORATORY COMMENT REPORT: ABNORMAL
LABORATORY COMMENT REPORT: ABNORMAL
LIMITATIONS OF THE TEST: ABNORMAL
LYMPHOCYTES # BLD AUTO: 1.7 X10E3/UL (ref 0.7–3.1)
LYMPHOCYTES NFR BLD AUTO: 27 %
MCH RBC QN AUTO: 30.6 PG (ref 26.6–33)
MCHC RBC AUTO-ENTMCNC: 32.4 G/DL (ref 31.5–35.7)
MCV RBC AUTO: 94 FL (ref 79–97)
METHADONE UR QL SCN: POSITIVE NG/ML
MONOCYTES # BLD AUTO: 0.3 X10E3/UL (ref 0.1–0.9)
MONOCYTES NFR BLD AUTO: 5 %
N GONORRHOEA RRNA SPEC QL NAA+PROBE: NEGATIVE
NEGATIVE PREDICTIVE VALUE: ABNORMAL
NEUTROPHILS # BLD AUTO: 4.2 X10E3/UL (ref 1.4–7)
NEUTROPHILS NFR BLD AUTO: 67 %
OPIATES UR QL SCN: POSITIVE NG/ML
OXYCODONE+OXYMORPHONE UR QL SCN: NEGATIVE NG/ML
PCP UR QL: NEGATIVE NG/ML
PERFORMANCE CHARACTERISTICS: ABNORMAL
PH UR: 5.9 [PH] (ref 4.5–8.9)
PLATELET # BLD AUTO: 441 X10E3/UL (ref 150–450)
POTASSIUM SERPL-SCNC: 3.7 MMOL/L (ref 3.5–5.2)
PROPOXYPH UR QL SCN: NEGATIVE NG/ML
PROT SERPL-MCNC: 6.3 G/DL (ref 6–8.5)
RBC # BLD AUTO: 3.6 X10E6/UL (ref 3.77–5.28)
REF LAB TEST METHOD: ABNORMAL
REF LAB TEST REF RANGE: NORMAL
RH BLD: NEGATIVE
RPR SER QL: NON REACTIVE
RUBV IGG SERPL IA-ACNC: 2.72 INDEX
SERVICE CMNT-IMP: ABNORMAL
SODIUM SERPL-SCNC: 138 MMOL/L (ref 134–144)
TEST PERFORMANCE INFO SPEC: ABNORMAL
WBC # BLD AUTO: 6.3 X10E3/UL (ref 3.4–10.8)
